# Patient Record
Sex: FEMALE | Race: BLACK OR AFRICAN AMERICAN | Employment: UNEMPLOYED | URBAN - METROPOLITAN AREA
[De-identification: names, ages, dates, MRNs, and addresses within clinical notes are randomized per-mention and may not be internally consistent; named-entity substitution may affect disease eponyms.]

---

## 2017-01-10 ENCOUNTER — ANESTHESIA EVENT (OUTPATIENT)
Dept: ENDOSCOPY | Age: 52
End: 2017-01-10
Payer: MEDICAID

## 2017-01-11 ENCOUNTER — ANESTHESIA (OUTPATIENT)
Dept: ENDOSCOPY | Age: 52
End: 2017-01-11
Payer: MEDICAID

## 2017-01-11 ENCOUNTER — SURGERY (OUTPATIENT)
Age: 52
End: 2017-01-11

## 2017-01-11 PROCEDURE — 74011250636 HC RX REV CODE- 250/636

## 2017-01-11 PROCEDURE — 74011250636 HC RX REV CODE- 250/636: Performed by: NURSE ANESTHETIST, CERTIFIED REGISTERED

## 2017-01-11 PROCEDURE — 74011000250 HC RX REV CODE- 250

## 2017-01-11 RX ORDER — PROPOFOL 10 MG/ML
INJECTION, EMULSION INTRAVENOUS AS NEEDED
Status: DISCONTINUED | OUTPATIENT
Start: 2017-01-11 | End: 2017-01-11 | Stop reason: HOSPADM

## 2017-01-11 RX ORDER — PROPOFOL 10 MG/ML
INJECTION, EMULSION INTRAVENOUS
Status: DISCONTINUED | OUTPATIENT
Start: 2017-01-11 | End: 2017-01-11 | Stop reason: HOSPADM

## 2017-01-11 RX ORDER — LIDOCAINE HYDROCHLORIDE 20 MG/ML
INJECTION, SOLUTION EPIDURAL; INFILTRATION; INTRACAUDAL; PERINEURAL AS NEEDED
Status: DISCONTINUED | OUTPATIENT
Start: 2017-01-11 | End: 2017-01-11 | Stop reason: HOSPADM

## 2017-01-11 RX ADMIN — PROPOFOL 100 MCG/KG/MIN: 10 INJECTION, EMULSION INTRAVENOUS at 08:35

## 2017-01-11 RX ADMIN — PROPOFOL 100 MG: 10 INJECTION, EMULSION INTRAVENOUS at 08:35

## 2017-01-11 RX ADMIN — SODIUM CHLORIDE, SODIUM LACTATE, POTASSIUM CHLORIDE, AND CALCIUM CHLORIDE: 600; 310; 30; 20 INJECTION, SOLUTION INTRAVENOUS at 08:30

## 2017-01-11 RX ADMIN — LIDOCAINE HYDROCHLORIDE 40 MG: 20 INJECTION, SOLUTION EPIDURAL; INFILTRATION; INTRACAUDAL; PERINEURAL at 08:35

## 2017-01-11 NOTE — ANESTHESIA POSTPROCEDURE EVALUATION
Post-Anesthesia Evaluation and Assessment    Patient: Ariana Mendoza MRN: [id number]  SSN: [id number]    YOB: 1965  Age: 45 y.o. Sex: female      Data from PACU flowsheet    Cardiovascular Function/Vital Signs  Visit Vitals    /65    Pulse 78    Temp 36.5 °C (97.7 °F)    Resp 16    Ht 5' 5\" (1.651 m)    Wt 103.9 kg (229 lb)    SpO2 96%    Breastfeeding No    BMI 38.11 kg/m2       Patient is status post anesthesia    Nausea/Vomiting: controlled    Postoperative hydration reviewed and adequate. Pain:  Managed    Neurological Status: At baseline    Mental Status and Level of Consciousness: Alert and oriented     Pulmonary Status:   Adequate oxygenation and airway patent    Complications related to anesthesia: None    Post-anesthesia assessment completed.  No concerns    Signed By: Philomena Xie CRNA     January 11, 2017

## 2017-01-11 NOTE — ANESTHESIA PREPROCEDURE EVALUATION
Anesthetic History   No history of anesthetic complications            Review of Systems / Medical History  Patient summary reviewed and pertinent labs reviewed    Pulmonary        Sleep apnea: CPAP    Asthma   Pertinent negatives: No smoker     Neuro/Psych   Within defined limits           Cardiovascular    Hypertension              Exercise tolerance: >4 METS     GI/Hepatic/Renal  Within defined limits              Endo/Other    Diabetes: type 2    Morbid obesity     Other Findings   Comments: Current Smoker? NO       Elective Surgery? Yes       Abstained from smoking 24 hours prior to anesthesia? N/A    Risk Factors for Postoperative nausea/vomiting:       History of postoperative nausea/vomiting? NO       Female? YES       Motion sickness? NO       Intended opioid administration for postoperative analgesia?   YES           Physical Exam    Airway  Mallampati: I  TM Distance: 4 - 6 cm  Neck ROM: normal range of motion   Mouth opening: Normal     Cardiovascular  Regular rate and rhythm,  S1 and S2 normal,  no murmur, click, rub, or gallop  Rhythm: regular  Rate: normal         Dental  No notable dental hx       Pulmonary  Breath sounds clear to auscultation               Abdominal  GI exam deferred       Other Findings            Anesthetic Plan    ASA: 3  Anesthesia type: MAC          Induction: Intravenous  Anesthetic plan and risks discussed with: Patient

## 2017-02-08 ENCOUNTER — HOSPITAL ENCOUNTER (OUTPATIENT)
Dept: GENERAL RADIOLOGY | Age: 52
Discharge: HOME | End: 2017-02-08
Payer: MEDICAID

## 2017-02-08 DIAGNOSIS — R10.13 EPIGASTRIC PAIN: ICD-10-CM

## 2017-02-08 PROCEDURE — 74020 XR ABD FLAT/ ERECT: CPT

## 2017-05-31 ENCOUNTER — HOSPITAL ENCOUNTER (OUTPATIENT)
Dept: PHYSICAL THERAPY | Age: 52
Discharge: HOME | End: 2017-05-31
Payer: MEDICAID

## 2017-05-31 PROCEDURE — 97162 PT EVAL MOD COMPLEX 30 MIN: CPT

## 2017-05-31 PROCEDURE — 97014 ELECTRIC STIMULATION THERAPY: CPT

## 2017-05-31 PROCEDURE — 97110 THERAPEUTIC EXERCISES: CPT
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
## 2017-06-01 ENCOUNTER — HOSPITAL ENCOUNTER (OUTPATIENT)
Dept: PHYSICAL THERAPY | Age: 52
Discharge: HOME | End: 2017-06-01
Payer: MEDICAID

## 2017-06-01 PROCEDURE — 97014 ELECTRIC STIMULATION THERAPY: CPT

## 2017-06-01 PROCEDURE — 97140 MANUAL THERAPY 1/> REGIONS: CPT

## 2017-06-01 PROCEDURE — 97110 THERAPEUTIC EXERCISES: CPT

## 2017-06-01 NOTE — PROGRESS NOTES
PT DAILY TREATMENT NOTE     Patient Name: Genet Press  Date:2017  : 1965  [x]  Patient  Verified  Payor: [employer] / Plan: Eriberto Slulivan / Product Type: Managed Care Medicaid /    In time:434  Out time:546  Total Treatment Time (min): 72  Visit #: 2 of     Treatment Area: Back pain, chronic [M54.9, G89.29]    SUBJECTIVE  Pain Level (0-10 scale): 7  Any medication changes, allergies to medications, adverse drug reactions, diagnosis change, or new procedure performed?: [x] No    [] Yes (see summary sheet for update)  Subjective functional status/changes:   [] No changes reported  \"I am doing ok. \"    OBJECTIVE  Modality rationale: decrease pain to improve the patients ability to improve activity tolerance    Min Type Additional Details   10 [x] Estim: []Att   [x]Unatt        []TENS instruct                  [x]IFC  []Premod   []NMES                     []Other:  []w/US   [x]w/ice B knee   [x]w/heat  Position: semi reclined  Location: LS     []  Traction: [] Cervical       []Lumbar                       [] Prone          []Supine                       []Intermittent   []Continuous Lbs:  [] before manual  [] after manual    []  Ultrasound: []Continuous   [] Pulsed                           []1MHz   []3MHz Location:  W/cm2:    []  Iontophoresis with dexamethasone         Location: [] Take home patch   [] In clinic    []  Ice     []  heat  []  Ice massage Position:  Location:    []  Vasopneumatic Device Pressure:       [] lo [] med [] hi   Temperature: [] lo [] med [] hi   [x] Skin assessment post-treatment:  [x]intact []redness- no adverse reaction       []redness  adverse reaction:       52 min Therapeutic Exercise:  [x] See flow sheet :Initiated ther ex per flow sheet    Rationale: increase ROM, increase strength, improve coordination and improve balance to improve the patients ability to decrease pain with activity and improved functional endurance     10 min Manual Therapy:  L hip PROM flexion, IR and ER   Rationale: decrease pain, increase ROM and increase tissue extensibility to decrease compensatory gait patterns and improve mobility to decrease hip and LS pain     x min Patient Education: [x] Review HEP from IE        Other Objective/Functional Measures: Therex initiated today - first FU post eval   Poor TS posture in seated     Pain Level (0-10 scale) post treatment: 5    ASSESSMENT/Changes in Function: Patient tolerated initiation of therex well - patient deconditioned but well motivated. 100% VC for form and technique for all newly introduced therex. Compensatory valsalva with TA but improved with practice. L hip significantly weakened from [facility] noted with march and LAQ. Increased time to complete therex sec to conditioning. Patient will continue to benefit from skilled PT services to modify and progress therapeutic interventions, address functional mobility deficits, address ROM deficits, address strength deficits, analyze and address soft tissue restrictions, analyze and cue movement patterns, analyze and modify body mechanics/ergonomics and assess and modify postural abnormalities to attain remaining goals.      [x]  See Plan of Care  []  See progress note/recertification  []  See Discharge Summary         Progress towards goals / Updated goals:  FIRST FU TREATMENT - CONT PER POT TO EST GOALS 6/1/2017    PLAN  [x]  Upgrade activities as tolerated     []  Continue plan of care  [x]  Update interventions per flow sheet       []  Discharge due to:_  [x]  Other:_assess response to first FU treatment       Kal Grace, PT 6/1/2017  11:14 AM

## 2017-06-05 ENCOUNTER — HOSPITAL ENCOUNTER (OUTPATIENT)
Dept: PHYSICAL THERAPY | Age: 52
Discharge: HOME | End: 2017-06-05
Payer: MEDICAID

## 2017-06-05 PROCEDURE — 97110 THERAPEUTIC EXERCISES: CPT

## 2017-06-05 PROCEDURE — 97140 MANUAL THERAPY 1/> REGIONS: CPT

## 2017-06-05 PROCEDURE — 97014 ELECTRIC STIMULATION THERAPY: CPT

## 2017-06-05 NOTE — PROGRESS NOTES
PT DAILY TREATMENT NOTE     Patient Name: Ynes Perales  Date:2017  : 1965  [x]  Patient  Verified  Payor: Giovana Benoit / Plan: [id number] / Product Type: Managed Care Medicaid /    In time: 11:35am     Out time: 11:45am  Total Treatment Time (min): 70  Visit #: 3 of     Treatment Area: Back pain, chronic [M54.9, G89.29]    SUBJECTIVE  Pain Level (0-10 scale): 10 in (R) knee, (L) low back  Any medication changes, allergies to medications, adverse drug reactions, diagnosis change, or new procedure performed?: [x] No    [] Yes (see summary sheet for update)  Subjective functional status/changes:   [] No changes reported  \"I have been using the back brace because everything hurts too much today. I did the exercises over the weekend, but not last night. I'd rather do these exercises than use pain medications. \"    OBJECTIVE  Modality rationale: decrease pain to improve the patients ability to improve activity tolerance    Min Type Additional Details   10 [x] Estim: []Att   [x]Unatt        []TENS instruct                  [x]IFC  []Premod   []NMES                     []Other:  []w/US   [x]w/ice B knee   [x]w/heat  Position: semi reclined  Location: LS     []  Traction: [] Cervical       []Lumbar                       [] Prone          []Supine                       []Intermittent   []Continuous Lbs:  [] before manual  [] after manual    []  Ultrasound: []Continuous   [] Pulsed                           []1MHz   []3MHz Location:  W/cm2:    []  Iontophoresis with dexamethasone         Location: [] Take home patch   [] In clinic    []  Ice     []  heat  []  Ice massage Position:  Location:    []  Vasopneumatic Device Pressure:       [] lo [] med [] hi   Temperature: [] lo [] med [] hi   [x] Skin assessment post-treatment:  [x]intact []redness- no adverse reaction       []redness  adverse reaction:     50 min Therapeutic Exercise:  [x] See flow sheet: added SKTC stretch, LTR, and piriformis stretch in supine with pushdown   Rationale: increase ROM, increase strength, improve coordination and improve balance to improve the patients ability to decrease pain with activity and improved functional endurance     10 min Manual Therapy:  L hip PROM flexion, IR and ER   Rationale: decrease pain, increase ROM and increase tissue extensibility to decrease compensatory gait patterns and improve mobility to decrease hip and LS pain     X min Patient Education: [x] Review HEP - added LTR, SKTC stretch, supine piriformis stretch, and nonsegmental rolling for bed mobility; refaxed POC to referring MD     Other Objective/Functional Measures:      Pain Level (0-10 scale) post treatment: 7     ASSESSMENT/Changes in Function:   Notable inc in pain levels patient attributes to poor weather. Discussed and instructed patient in non-segmental rolling with TA draw for supine<>sit transfers with patient able to properly return-demo in clinic. Significant ROM deficits into flexion as indicated by difficulty with seated marches; provided cueing for maintenance of upright posture and limited hip flexion without lumbar flexion for inc control. (+) response to PROM hip stretching with emphasis on [place]@R2integrated versus IR to improve piriformis tightness. Patient will continue to benefit from skilled PT services to modify and progress therapeutic interventions, address functional mobility deficits, address ROM deficits, address strength deficits, analyze and address soft tissue restrictions, analyze and cue movement patterns, analyze and modify body mechanics/ergonomics and assess and modify postural abnormalities to attain remaining goals. [x]  See Plan of Care  []  See progress note/recertification  []  See Discharge Summary         Progress towards goals / Updated goals:  Short Term Goals: To be accomplished in 1 weeks:  1. Pt will be independent and compliant with HEP.  -Goal progressing; patient reporting intermittent compliance; encouraged inc adherence to improve length of post-chain musculature to aid in hip mobility and reduce low back strain (6/5/17)  Long Term Goals: To be accomplished in 8-12 treatments:  1. Patient will increase FOTO score to 51/100 for indications of increased functional mobility. 2. Patient will demo increased LS flexion fingertips to mid shin for ease with LE dressing   3. Patient will demo 50% bridge for improved core strength and standing tolerance with ADLs   4.  Patient will amb 500 ft in clinic with [facility] for progression of amb tolerance for community mobility     PLAN  [x]  Upgrade activities as tolerated     [x]  Continue plan of care  [x]  Update interventions per flow sheet       []  Discharge due to:_  [x]  Other: cont to encourage wean from L/S brace as tolerated; add sheron Sands Poag, PTA 6/5/2017

## 2017-06-07 ENCOUNTER — HOSPITAL ENCOUNTER (OUTPATIENT)
Dept: PHYSICAL THERAPY | Age: 52
Discharge: HOME | End: 2017-06-07
Payer: MEDICAID

## 2017-06-07 PROCEDURE — 97014 ELECTRIC STIMULATION THERAPY: CPT

## 2017-06-07 PROCEDURE — 97110 THERAPEUTIC EXERCISES: CPT

## 2017-06-07 PROCEDURE — 97140 MANUAL THERAPY 1/> REGIONS: CPT

## 2017-06-07 NOTE — PROGRESS NOTES
PT DAILY TREATMENT NOTE     Patient Name: Alan Spurling  Date:2017  : 1965  [x]  Patient  Verified  Payor: Ian Celeste / Plan: [employer] / Product Type: Managed Care Medicaid /    In time: 6487   Out time: 3764  Total Treatment Time (min): 73  Visit #: 4 of     Treatment Area: Back pain, chronic [M54.9, G89.29]    SUBJECTIVE  Pain Level (0-10 scale):R knees, 8 back   Any medication changes, allergies to medications, adverse drug reactions, diagnosis change, or new procedure performed?: [x] No    [] Yes (see summary sheet for update)  Subjective functional status/changes:   [] No changes reported  \"I got my xray results. \"    OBJECTIVE  Modality rationale: decrease pain to improve the patients ability to improve activity tolerance    Min Type Additional Details   15 [x] Estim: []Att   [x]Unatt        []TENS instruct                  [x]IFC  []Premod   []NMES                     []Other:  []w/US   [x]w/ice B knee   [x]w/heat  Position: semi reclined  Location: LS     []  Traction: [] Cervical       []Lumbar                       [] Prone          []Supine                       []Intermittent   []Continuous Lbs:  [] before manual  [] after manual    []  Ultrasound: []Continuous   [] Pulsed                           []1MHz   []3MHz Location:  W/cm2:    []  Iontophoresis with dexamethasone         Location: [] Take home patch   [] In clinic    []  Ice     []  heat  []  Ice massage Position:  Location:    []  Vasopneumatic Device Pressure:       [] lo [] med [] hi   Temperature: [] lo [] med [] hi   [x] Skin assessment post-treatment:  [x]intact []redness- no adverse reaction       []redness  adverse reaction:     45 min Therapeutic Exercise:  [x] See flow sheet:    Rationale: increase ROM, increase strength, improve coordination and improve balance to improve the patients ability to decrease pain with activity and improved functional endurance     13 min Manual Therapy:  Prone LS STM and light touch for desensitization    Rationale: decrease pain, increase ROM and increase tissue extensibility to decrease compensatory gait patterns and improve mobility to decrease hip and LS pain     X min Patient Education: [x] Review HEP -progressed last session      Other Objective/Functional Measures:     Unable to initiate bridges sec to pain in supine   Added sit to stand to functional LE strengthening      Pain Level (0-10 scale) post treatment: 7    ASSESSMENT/Changes in Function: Patient with increased L hip pain and inability to lay supine sec to pain. Patient encouraged to return to surgeon that performed THR sec to groin pain. Significant tenderness to light touch that improved to fair tolerance with STM with manual today. Patient brought copy of TS xray and patient educated on results including moderate degenerative changes in disc and scoliosis- patient educated PT is indicated for both these dx. Patient will continue to benefit from skilled PT services to modify and progress therapeutic interventions, address functional mobility deficits, address ROM deficits, address strength deficits, analyze and address soft tissue restrictions, analyze and cue movement patterns, analyze and modify body mechanics/ergonomics and assess and modify postural abnormalities to attain remaining goals. [x]  See Plan of Care  []  See progress note/recertification  []  See Discharge Summary         Progress towards goals / Updated goals: All goals reviewed and progressing appropriately as of 6/7/2017   Short Term Goals: To be accomplished in 1 weeks:  1. Pt will be independent and compliant with HEP. -Goal progressing; patient reporting intermittent compliance; encouraged inc adherence to improve length of post-chain musculature to aid in hip mobility and reduce low back strain (6/5/17)  Long Term Goals: To be accomplished in 8-12 treatments:  1.  Patient will increase FOTO score to 51/100 for indications of increased functional mobility. 2. Patient will demo increased LS flexion fingertips to mid shin for ease with LE dressing   3. Patient will demo 50% bridge for improved core strength and standing tolerance with ADLs   4.  Patient will amb 500 ft in clinic with [facility] for progression of amb tolerance for community mobility     PLAN  [x]  Upgrade activities as tolerated     [x]  Continue plan of care  [x]  Update interventions per flow sheet       []  Discharge due to:_  [x]  Other: POC has bee signed - can schedule past 6/15 for the rest of June     Dariela Bruce, PT 6/7/2017

## 2017-06-12 ENCOUNTER — HOSPITAL ENCOUNTER (OUTPATIENT)
Dept: PHYSICAL THERAPY | Age: 52
Discharge: HOME | End: 2017-06-12
Payer: MEDICAID

## 2017-06-12 PROCEDURE — 97140 MANUAL THERAPY 1/> REGIONS: CPT

## 2017-06-12 PROCEDURE — 97110 THERAPEUTIC EXERCISES: CPT

## 2017-06-12 NOTE — PROGRESS NOTES
PT DAILY TREATMENT NOTE     Patient Name: Dinesh Recinos  Date:2017  : 1965  [x]  Patient  Verified  Payor: Verónica Diaz / Plan: Pema Dominguez / Product Type: Managed Care Medicaid /    In time: 11:01am      Out time: 12:00pm  Total Treatment Time (min): 59  Visit #: 5 of     Treatment Area: Back pain, chronic [M54.9, G89.29]    SUBJECTIVE  Pain Level (0-10 scale): 8 in L/S; 6 in (B) knees  Any medication changes, allergies to medications, adverse drug reactions, diagnosis change, or new procedure performed?: [x] No    [] Yes (see summary sheet for update)  Subjective functional status/changes:   [] No changes reported  \"The knees feel better. I have an appointment with the doctor who fixed my hip. I felt like the work Romana Hernández did on me last time helped. \" Patient denies radicular symptoms.      OBJECTIVE  Modality rationale: decrease pain to improve the patients ability to improve activity tolerance    Min Type Additional Details   TC [x] Estim: []Att   [x]Unatt        []TENS instruct                  [x]IFC  []Premod   []NMES                     []Other:  []w/US   [x]w/ice B knee   [x]w/heat  Position: semi reclined  Location: LS     []  Traction: [] Cervical       []Lumbar                       [] Prone          []Supine                       []Intermittent   []Continuous Lbs:  [] before manual  [] after manual    []  Ultrasound: []Continuous   [] Pulsed                           []1MHz   []3MHz Location:  W/cm2:    []  Iontophoresis with dexamethasone         Location: [] Take home patch   [] In clinic    []  Ice     []  heat  []  Ice massage Position:  Location:    []  Vasopneumatic Device Pressure:       [] lo [] med [] hi   Temperature: [] lo [] med [] hi   [x] Skin assessment post-treatment:  [x]intact []redness- no adverse reaction       []redness  adverse reaction:     34 min Therapeutic Exercise:  [x] See flow sheet: added ELIZABETH   Rationale: increase ROM, increase strength, improve coordination and improve balance to improve the patients ability to decrease pain with activity and improved functional endurance     25 min Manual Therapy:  prone (L) QL/LPS and (R) TPS/latissimus dorsi STM and light touch for desensitization; supine (L) hip PROM for flexion, IR/ER   Rationale: decrease pain, increase ROM and increase tissue extensibility to decrease compensatory gait patterns and improve mobility to decrease hip and LS pain     X min Patient Education: [x] Review HEP; discussed positioning for bed mobility to aid in sleep quality     Other Objective/Functional Measures:     ELIZABETH: inc pain, no residual   (+) TTP/inc tissue density along (L)>(R) QL and LPS   (+) pain with supine lying > hook lying    Pain Level (0-10 scale) post treatment: 6-7    ASSESSMENT/Changes in Function:   Improved control and stability with sit<>stand transfers. Added ELIZABETH to assess spinal involvement versus mm with NE. (+) TrP at (L) QL addressed well with manual interventions. Mild improvements in standing posture with reduced hyperlordosis. Patient will continue to benefit from skilled PT services to modify and progress therapeutic interventions, address functional mobility deficits, address ROM deficits, address strength deficits, analyze and address soft tissue restrictions, analyze and cue movement patterns, analyze and modify body mechanics/ergonomics and assess and modify postural abnormalities to attain remaining goals. [x]  See Plan of Care  []  See progress note/recertification  []  See Discharge Summary         Progress towards goals / Updated goals: All goals reviewed and progressing appropriately as of 6/12/2017   Short Term Goals: To be accomplished in 1 weeks:  1. Pt will be independent and compliant with HEP.  -Goal progressing; patient reporting intermittent compliance; encouraged inc adherence to improve length of post-chain musculature to aid in hip mobility and reduce low back strain (6/5/17)  Long Term Goals: To be accomplished in 8-12 treatments:  1. Patient will increase FOTO score to 51/100 for indications of increased functional mobility. 2. Patient will demo increased LS flexion fingertips to mid shin for ease with LE dressing   3. Patient will demo 50% bridge for improved core strength and standing tolerance with ADLs   4.  Patient will amb 500 ft in clinic with [facility] for progression of amb tolerance for community mobility     PLAN  [x]  Upgrade activities as tolerated     [x]  Continue plan of care  [x]  Update interventions per flow sheet       []  Discharge due to:_  [x]  Other: assess response to inc time with manual; add standing QL stretch    Perales Speak, PTA 6/12/2017

## 2017-06-15 ENCOUNTER — APPOINTMENT (OUTPATIENT)
Dept: PHYSICAL THERAPY | Age: 52
End: 2017-06-15
Payer: MEDICAID

## 2017-06-15 ENCOUNTER — HOSPITAL ENCOUNTER (OUTPATIENT)
Dept: CT IMAGING | Age: 52
Discharge: HOME | End: 2017-06-15
Attending: INTERNAL MEDICINE
Payer: MEDICAID

## 2017-06-15 DIAGNOSIS — R10.33 PERIUMBILICAL PAIN: ICD-10-CM

## 2017-06-15 LAB — CREAT UR-MCNC: 0.8 MG/DL (ref 0.6–1.3)

## 2017-06-15 PROCEDURE — 74011000255 HC RX REV CODE- 255: Performed by: INTERNAL MEDICINE

## 2017-06-15 PROCEDURE — 74177 CT ABD & PELVIS W/CONTRAST: CPT

## 2017-06-15 PROCEDURE — 74011636320 HC RX REV CODE- 636/320: Performed by: INTERNAL MEDICINE

## 2017-06-15 PROCEDURE — 82565 ASSAY OF CREATININE: CPT

## 2017-06-15 RX ORDER — BARIUM SULFATE 20 MG/ML
900 SUSPENSION ORAL
Status: COMPLETED | OUTPATIENT
Start: 2017-06-15 | End: 2017-06-15

## 2017-06-15 RX ADMIN — BARIUM SULFATE 900 ML: 21 SUSPENSION ORAL at 08:27

## 2017-06-15 RX ADMIN — IOPAMIDOL 100 ML: 612 INJECTION, SOLUTION INTRAVENOUS at 08:27

## 2017-06-16 ENCOUNTER — HOSPITAL ENCOUNTER (OUTPATIENT)
Dept: PHYSICAL THERAPY | Age: 52
End: 2017-06-16
Payer: MEDICAID

## 2017-06-22 ENCOUNTER — HOSPITAL ENCOUNTER (OUTPATIENT)
Dept: PHYSICAL THERAPY | Age: 52
Discharge: HOME | End: 2017-06-22
Payer: MEDICAID

## 2017-06-22 PROCEDURE — 97140 MANUAL THERAPY 1/> REGIONS: CPT

## 2017-06-22 PROCEDURE — 97110 THERAPEUTIC EXERCISES: CPT

## 2017-06-22 PROCEDURE — 97014 ELECTRIC STIMULATION THERAPY: CPT

## 2017-06-22 NOTE — PROGRESS NOTES
PT DAILY TREATMENT NOTE     Patient Name: Lou Madison  Date:2017  : 1965  [x]  Patient  Verified  Payor: Tracy Velasco / Plan: [id number] / Product Type: Managed Care Medicaid /    In time: 1:35pm      Out time: 2:14pm  Total Treatment Time (min): 39  Visit #: 6 of     Treatment Area: Back pain, chronic [M54.9, G89.29]    SUBJECTIVE  Pain Level (0-10 scale): 8 in L/S, 5 in (B) knees  Any medication changes, allergies to medications, adverse drug reactions, diagnosis change, or new procedure performed?: [] No    [x] Yes (see summary sheet for update)  Subjective functional status/changes:   [] No changes reported  \"I see the doctor who fixed my hip on Tuesday. I slipped and fell in the bathtub. \"    OBJECTIVE  Modality rationale: decrease pain to improve the patients ability to improve activity tolerance    Min Type Additional Details   10 [x] Estim: []Att   [x]Unatt        []TENS instruct                  [x]IFC  []Premod   []NMES                     []Other:  []w/US   [x]w/ice B knee   [x]w/heat  Position: semi reclined  Location: LS     []  Traction: [] Cervical       []Lumbar                       [] Prone          []Supine                       []Intermittent   []Continuous Lbs:  [] before manual  [] after manual    []  Ultrasound: []Continuous   [] Pulsed                           []1MHz   []3MHz Location:  W/cm2:    []  Iontophoresis with dexamethasone         Location: [] Take home patch   [] In clinic    []  Ice     []  heat  []  Ice massage Position:  Location:    []  Vasopneumatic Device Pressure:       [] lo [] med [] hi   Temperature: [] lo [] med [] hi   [x] Skin assessment post-treatment:  [x]intact []redness- no adverse reaction       []redness  adverse reaction:     9 min Therapeutic Exercise:  [x] See flow sheet: attempted side glide to (R) - poor tolerance   Rationale: increase ROM, increase strength, improve coordination and improve balance to improve the patients ability to decrease pain with activity and improved functional endurance     20 min Manual Therapy:  prone (B) QL, TPS, and LPS in prone; attempted shift correction but not tolerated well   Rationale: decrease pain, increase ROM and increase tissue extensibility to decrease compensatory gait patterns and improve mobility to decrease hip and LS pain     X min Patient Education: [x] Review HEP     Other Objective/Functional Measures:      Pain Level (0-10 scale) post treatment: 0    ASSESSMENT/Changes in Function:   Significant posterior chain tightness improved with manual interventions. Reviewed proper postural awareness for bed mobility and sit<>stands with excellent carryover. Pt notes recent fall in bathtub causing inc (R) low back pain, went to [facility], and was given x-rays pt reports DDD. Able to abolish pain today with modalities and manual.    Patient will continue to benefit from skilled PT services to modify and progress therapeutic interventions, address functional mobility deficits, address ROM deficits, address strength deficits, analyze and address soft tissue restrictions, analyze and cue movement patterns, analyze and modify body mechanics/ergonomics and assess and modify postural abnormalities to attain remaining goals. [x]  See Plan of Care  []  See progress note/recertification  []  See Discharge Summary         Progress towards goals / Updated goals: All goals reviewed and progressing appropriately as of 6/22/2017   Short Term Goals: To be accomplished in 1 weeks:  1. Pt will be independent and compliant with HEP. -Goal progressing; patient reporting intermittent compliance; encouraged inc adherence to improve length of post-chain musculature to aid in hip mobility and reduce low back strain (6/5/17)  Long Term Goals: To be accomplished in 8-12 treatments:  1. Patient will increase FOTO score to 51/100 for indications of increased functional mobility.    2. Patient will demo increased LS flexion fingertips to mid shin for ease with LE dressing   3. Patient will demo 50% bridge for improved core strength and standing tolerance with ADLs   4.  Patient will amb 500 ft in clinic with [facility] for progression of amb tolerance for community mobility     PLAN  [x]  Upgrade activities as tolerated     [x]  Continue plan of care  [x]  Update interventions per flow sheet       []  Discharge due to:_  [x]  Other: assess carryover with treatment, pt demo significant improvement in posterior chain extensibility and has been educated on effect of posture on pain    Fabiola Mena, PTA 6/22/2017

## 2017-06-23 RX ORDER — OMEPRAZOLE 20 MG/1
20 CAPSULE, DELAYED RELEASE ORAL DAILY
COMMUNITY

## 2017-06-26 ENCOUNTER — HOSPITAL ENCOUNTER (OUTPATIENT)
Dept: PHYSICAL THERAPY | Age: 52
Discharge: HOME | End: 2017-06-26
Payer: MEDICAID

## 2017-06-26 PROCEDURE — 97110 THERAPEUTIC EXERCISES: CPT

## 2017-06-26 PROCEDURE — 97014 ELECTRIC STIMULATION THERAPY: CPT

## 2017-06-26 NOTE — PROGRESS NOTES
PT DAILY TREATMENT NOTE     Patient Name: Da Christensen  Date:2017  : 1965  [x]  Patient  Verified  Payor: Gris Najera / Plan: [employer] / Product Type: Managed Care Medicaid /    In time:     Out time:   Total Treatment Time (min): 55  Visit #: 7 of     Treatment Area: Back pain, chronic [M54.9, G89.29]    SUBJECTIVE  Pain Level (0-10 scale): 7/10 LS   Any medication changes, allergies to medications, adverse drug reactions, diagnosis change, or new procedure performed?: [] No    [x] Yes (see summary sheet for update)  Subjective functional status/changes:   [] No changes reported  \"I go see the back doctor on . \"    OBJECTIVE  Modality rationale: decrease pain to improve the patients ability to improve activity tolerance    Min Type Additional Details   10 [x] Estim: []Att   [x]Unatt        []TENS instruct                  [x]IFC  []Premod   []NMES                     []Other:  []w/US   []w/ice B knee: PD    [x]w/heat  Position: prone  Location: LS     []  Traction: [] Cervical       []Lumbar                       [] Prone          []Supine                       []Intermittent   []Continuous Lbs:  [] before manual  [] after manual    []  Ultrasound: []Continuous   [] Pulsed                           []1MHz   []3MHz Location:  W/cm2:    []  Iontophoresis with dexamethasone         Location: [] Take home patch   [] In clinic    []  Ice     []  heat  []  Ice massage Position:  Location:    []  Vasopneumatic Device Pressure:       [] lo [] med [] hi   Temperature: [] lo [] med [] hi   [x] Skin assessment post-treatment:  [x]intact []redness- no adverse reaction       []redness  adverse reaction:     42 min Therapeutic Exercise:  [x] See flow sheet:    Rationale: increase ROM, increase strength, improve coordination and improve balance to improve the patients ability to decrease pain with activity and improved functional endurance     3 min Manual Therapy: Attempted STM and sacral float - co sharp pain   Rationale: decrease pain, increase ROM and increase tissue extensibility to decrease compensatory gait patterns and improve mobility to decrease hip and LS pain     X min Patient Education: [x] Review HEP     Other Objective/Functional Measures:     STG 1 assessed   LTG 2 - LS flexion : mid shin (after repeated flexion stretch)   Improvement: sitting tolerance on car ride 2.5 hours    Pain Level (0-10 scale) post treatment: 3    ASSESSMENT/Changes in Function: Patient educated on upcoming assessment on next visit. Patient to see Dr Antonio Barrientos tomorrow and [facility] on July 6. Will reassess NV for PN to hold chart open until seeing 2 other MDs. Held manual after attempted 3 min with poor tolerance. Added prone therex for posterior chain strengthening with fair tolerance and quick fatigue of gluteals. Patient will continue to benefit from skilled PT services to modify and progress therapeutic interventions, address functional mobility deficits, address ROM deficits, address strength deficits, analyze and address soft tissue restrictions, analyze and cue movement patterns, analyze and modify body mechanics/ergonomics and assess and modify postural abnormalities to attain remaining goals. [x]  See Plan of Care  []  See progress note/recertification  []  See Discharge Summary         Progress towards goals / Updated goals: All goals reviewed and progressing appropriately as of 6/26/2017   Short Term Goals: To be accomplished in 1 weeks:  1. Pt will be independent and compliant with HEP. -Goal progressing - patient reports every other day compliance and elliptical use for 3 min 6/26/17  Long Term Goals: To be accomplished in 8-12 treatments:  1. Patient will increase FOTO score to 51/100 for indications of increased functional mobility.    2. Patient will demo increased LS flexion fingertips to mid shin for ease with LE dressing - Goal met - patient demo mid shin AROM (fingertips to patella at IE) 6/26/17  3. Patient will demo 50% bridge for improved core strength and standing tolerance with ADLs   4. Patient will amb 500 ft in clinic with [facility] for progression of amb tolerance for community mobility     PLAN  [x]  Upgrade activities as tolerated     [x]  Continue plan of care  [x]  Update interventions per flow sheet       []  Discharge due to:_  [x]  Other: PN DUE NV  - will hold chart open for 30 days pending other MD apts.     Isaiah Rosario, PT 6/26/2017

## 2017-06-27 ENCOUNTER — ANESTHESIA EVENT (OUTPATIENT)
Dept: ENDOSCOPY | Age: 52
End: 2017-06-27
Payer: MEDICAID

## 2017-06-28 ENCOUNTER — ANESTHESIA (OUTPATIENT)
Dept: ENDOSCOPY | Age: 52
End: 2017-06-28
Payer: MEDICAID

## 2017-06-28 ENCOUNTER — HOSPITAL ENCOUNTER (OUTPATIENT)
Age: 52
Setting detail: OUTPATIENT SURGERY
Discharge: HOME | End: 2017-06-28
Attending: INTERNAL MEDICINE | Admitting: INTERNAL MEDICINE
Payer: MEDICAID

## 2017-06-28 VITALS
DIASTOLIC BLOOD PRESSURE: 91 MMHG | HEIGHT: 65 IN | OXYGEN SATURATION: 100 % | RESPIRATION RATE: 16 BRPM | BODY MASS INDEX: 39.15 KG/M2 | SYSTOLIC BLOOD PRESSURE: 136 MMHG | WEIGHT: 235 LBS | TEMPERATURE: 98.8 F | HEART RATE: 77 BPM

## 2017-06-28 LAB
BUN BLD-MCNC: 13 MG/DL (ref 7–18)
CHLORIDE BLD-SCNC: 105 MMOL/L (ref 100–108)
GLUCOSE BLD STRIP.AUTO-MCNC: 99 MG/DL (ref 74–106)
HCT VFR BLD CALC: 38 % (ref 36–49)
HGB BLD-MCNC: 12.9 G/DL (ref 12–16)
POTASSIUM BLD-SCNC: 3.8 MMOL/L (ref 3.5–5.5)
SODIUM BLD-SCNC: 143 MMOL/L (ref 136–145)

## 2017-06-28 PROCEDURE — 82947 ASSAY GLUCOSE BLOOD QUANT: CPT

## 2017-06-28 PROCEDURE — 76060000031 HC ANESTHESIA FIRST 0.5 HR: Performed by: INTERNAL MEDICINE

## 2017-06-28 PROCEDURE — 88305 TISSUE EXAM BY PATHOLOGIST: CPT | Performed by: INTERNAL MEDICINE

## 2017-06-28 PROCEDURE — 77030031670 HC DEV INFL ENDOTEK BIG60 MRTM -B: Performed by: INTERNAL MEDICINE

## 2017-06-28 PROCEDURE — 74011250636 HC RX REV CODE- 250/636

## 2017-06-28 PROCEDURE — 74011250636 HC RX REV CODE- 250/636: Performed by: NURSE ANESTHETIST, CERTIFIED REGISTERED

## 2017-06-28 PROCEDURE — 76040000019: Performed by: INTERNAL MEDICINE

## 2017-06-28 PROCEDURE — 74011000250 HC RX REV CODE- 250

## 2017-06-28 PROCEDURE — 77030009426 HC FCPS BIOP ENDOSC BSC -B: Performed by: INTERNAL MEDICINE

## 2017-06-28 RX ORDER — SODIUM CHLORIDE 0.9 % (FLUSH) 0.9 %
5-10 SYRINGE (ML) INJECTION AS NEEDED
Status: DISCONTINUED | OUTPATIENT
Start: 2017-06-28 | End: 2017-06-28 | Stop reason: HOSPADM

## 2017-06-28 RX ORDER — LIDOCAINE HYDROCHLORIDE 10 MG/ML
0.1 INJECTION, SOLUTION EPIDURAL; INFILTRATION; INTRACAUDAL; PERINEURAL AS NEEDED
Status: DISCONTINUED | OUTPATIENT
Start: 2017-06-28 | End: 2017-06-28 | Stop reason: HOSPADM

## 2017-06-28 RX ORDER — LIDOCAINE HYDROCHLORIDE 20 MG/ML
INJECTION, SOLUTION EPIDURAL; INFILTRATION; INTRACAUDAL; PERINEURAL AS NEEDED
Status: DISCONTINUED | OUTPATIENT
Start: 2017-06-28 | End: 2017-06-28 | Stop reason: HOSPADM

## 2017-06-28 RX ORDER — PROPOFOL 10 MG/ML
INJECTION, EMULSION INTRAVENOUS AS NEEDED
Status: DISCONTINUED | OUTPATIENT
Start: 2017-06-28 | End: 2017-06-28 | Stop reason: HOSPADM

## 2017-06-28 RX ORDER — FAMOTIDINE 20 MG/1
20 TABLET, FILM COATED ORAL ONCE
Status: DISCONTINUED | OUTPATIENT
Start: 2017-06-28 | End: 2017-06-28 | Stop reason: HOSPADM

## 2017-06-28 RX ORDER — SODIUM CHLORIDE 0.9 % (FLUSH) 0.9 %
5-10 SYRINGE (ML) INJECTION EVERY 8 HOURS
Status: DISCONTINUED | OUTPATIENT
Start: 2017-06-28 | End: 2017-06-28 | Stop reason: HOSPADM

## 2017-06-28 RX ORDER — DEXTROMETHORPHAN/PSEUDOEPHED 2.5-7.5/.8
1.2 DROPS ORAL
Status: DISCONTINUED | OUTPATIENT
Start: 2017-06-28 | End: 2017-06-28 | Stop reason: HOSPADM

## 2017-06-28 RX ORDER — SODIUM CHLORIDE, SODIUM LACTATE, POTASSIUM CHLORIDE, CALCIUM CHLORIDE 600; 310; 30; 20 MG/100ML; MG/100ML; MG/100ML; MG/100ML
25 INJECTION, SOLUTION INTRAVENOUS CONTINUOUS
Status: DISCONTINUED | OUTPATIENT
Start: 2017-06-28 | End: 2017-06-28 | Stop reason: HOSPADM

## 2017-06-28 RX ORDER — DEXTROSE MONOHYDRATE 25 G/50ML
25-50 INJECTION, SOLUTION INTRAVENOUS AS NEEDED
Status: DISCONTINUED | OUTPATIENT
Start: 2017-06-28 | End: 2017-06-28 | Stop reason: HOSPADM

## 2017-06-28 RX ORDER — MAGNESIUM SULFATE 100 %
4 CRYSTALS MISCELLANEOUS AS NEEDED
Status: DISCONTINUED | OUTPATIENT
Start: 2017-06-28 | End: 2017-06-28 | Stop reason: HOSPADM

## 2017-06-28 RX ORDER — INSULIN LISPRO 100 [IU]/ML
INJECTION, SOLUTION INTRAVENOUS; SUBCUTANEOUS ONCE
Status: DISCONTINUED | OUTPATIENT
Start: 2017-06-28 | End: 2017-06-28 | Stop reason: HOSPADM

## 2017-06-28 RX ADMIN — PROPOFOL 50 MG: 10 INJECTION, EMULSION INTRAVENOUS at 10:42

## 2017-06-28 RX ADMIN — PROPOFOL 50 MG: 10 INJECTION, EMULSION INTRAVENOUS at 10:46

## 2017-06-28 RX ADMIN — LIDOCAINE HYDROCHLORIDE 100 MG: 20 INJECTION, SOLUTION EPIDURAL; INFILTRATION; INTRACAUDAL; PERINEURAL at 10:42

## 2017-06-28 RX ADMIN — PROPOFOL 50 MG: 10 INJECTION, EMULSION INTRAVENOUS at 10:43

## 2017-06-28 RX ADMIN — PROPOFOL 20 MG: 10 INJECTION, EMULSION INTRAVENOUS at 10:54

## 2017-06-28 RX ADMIN — SODIUM CHLORIDE, SODIUM LACTATE, POTASSIUM CHLORIDE, AND CALCIUM CHLORIDE 25 ML/HR: 600; 310; 30; 20 INJECTION, SOLUTION INTRAVENOUS at 10:10

## 2017-06-28 RX ADMIN — PROPOFOL 50 MG: 10 INJECTION, EMULSION INTRAVENOUS at 10:50

## 2017-06-28 NOTE — ANESTHESIA PREPROCEDURE EVALUATION
Anesthetic History   No history of anesthetic complications            Review of Systems / Medical History  Patient summary reviewed and pertinent labs reviewed    Pulmonary        Sleep apnea: CPAP    Asthma : well controlled       Neuro/Psych         Psychiatric history     Cardiovascular    Hypertension                   GI/Hepatic/Renal     GERD: well controlled      PUD     Endo/Other    Diabetes: well controlled, type 2    Arthritis     Other Findings   Comments:   Risk Factors for Postoperative nausea/vomiting:       History of postoperative nausea/vomiting? NO       Female? YES       Motion sickness? NO       Intended opioid administration for postoperative analgesia? NO      Smoking Abstinence  Current Smoker? NO  Elective Surgery? YES  Seen preoperatively by anesthesiologist or proxy prior to day of surgery? YES  Pt abstained from smoking 24 hours prior to anesthesia?  N/A           Physical Exam    Airway  Mallampati: II  TM Distance: 4 - 6 cm  Neck ROM: normal range of motion   Mouth opening: Normal     Cardiovascular               Dental         Pulmonary                 Abdominal  GI exam deferred       Other Findings            Anesthetic Plan    ASA: 3  Anesthesia type: MAC            Anesthetic plan and risks discussed with: Patient

## 2017-06-28 NOTE — DISCHARGE INSTRUCTIONS
Patient Discharge Instructions    Jeffry Flores / [id number] : 1965    Admitted 2017 Discharged: 2017         Procedure Impression:  1. Single 3mm rectum polyp removed with biopsy forceps. 2. Diverticulosis with no diverticulitis. 3. Otherwise normal colonoscopy including terminal ileum evaluation. Recommendation:  1. Resume regular diet, recommend high fiber  2. Will contact with polyp results in 2 weeks. These results will determine timing to next colon cancer screening. 3. Please contact our office if you have not received the results by three weeks. Recommended Diet: Regular Diet    Recommended Activity:    1. Do not drink alcohol, drive or operate machinery for 12 hours   2. Call if any fever, abdominal pain or bleeding noted. Signed By: Amy Hauser MD     2017         DISCHARGE SUMMARY from Nurse    The following personal items are in your possession at time of discharge:    Dental Appliances: None  Visual Aid: Glasses                            PATIENT INSTRUCTIONS:    After general anesthesia or intravenous sedation, for 24 hours or while taking prescription Narcotics:  · Limit your activities  · Do not drive and operate hazardous machinery  · Do not make important personal or business decisions  · Do  not drink alcoholic beverages  · If you have not urinated within 8 hours after discharge, please contact your surgeon on call.     Report the following to your surgeon:  · Excessive pain, swelling, redness or odor of or around the surgical area  · Temperature over 100.5  · Nausea and vomiting lasting longer than 4 hours or if unable to take medications  · Any signs of decreased circulation or nerve impairment to extremity: change in color, persistent  numbness, tingling, coldness or increase pain  · Any questions        What to do at Home:        These are general instructions for a healthy lifestyle:    No smoking/ No tobacco products/ Avoid exposure to second hand smoke    Surgeon General's Warning:  Quitting smoking now greatly reduces serious risk to your health. Obesity, smoking, and sedentary lifestyle greatly increases your risk for illness    A healthy diet, regular physical exercise & weight monitoring are important for maintaining a healthy lifestyle    You may be retaining fluid if you have a history of heart failure or if you experience any of the following symptoms:  Weight gain of 3 pounds or more overnight or 5 pounds in a week, increased swelling in our hands or feet or shortness of breath while lying flat in bed. Please call your doctor as soon as you notice any of these symptoms; do not wait until your next office visit. Recognize signs and symptoms of STROKE:    F-face looks uneven    A-arms unable to move or move unevenly    S-speech slurred or non-existent    T-time-call 911 as soon as signs and symptoms begin-DO NOT go       Back to bed or wait to see if you get better-TIME IS BRAIN. Warning Signs of HEART ATTACK     Call 911 if you have these symptoms:   Chest discomfort. Most heart attacks involve discomfort in the center of the chest that lasts more than a few minutes, or that goes away and comes back. It can feel like uncomfortable pressure, squeezing, fullness, or pain.  Discomfort in other areas of the upper body. Symptoms can include pain or discomfort in one or both arms, the back, neck, jaw, or stomach.  Shortness of breath with or without chest discomfort.  Other signs may include breaking out in a cold sweat, nausea, or lightheadedness. Don't wait more than five minutes to call 911 - MINUTES MATTER! Fast action can save your life. Calling 911 is almost always the fastest way to get lifesaving treatment. Emergency Medical Services staff can begin treatment when they arrive -- up to an hour sooner than if someone gets to the hospital by car. The discharge information has been reviewed with the patient.   The patient verbalized understanding. Discharge medications reviewed with the patient and appropriate educational materials and side effects teaching were provided. Patient armband removed and given to patient to take home.   Patient was informed of the privacy risks if armband lost or stolen

## 2017-06-28 NOTE — H&P
History and Physical    Trever Flores        1965  [id number]        [id number]     Pre-Procedure Diagnosis:  constipation  k59.00    Chief Complaint:  No chief complaint on file. HPI: Patient with constipation with recent worsening. She has family history of colon cancer and overdue for screening. No blood with bowel movements. No nausea or vomiting. She has associated pain but no source on ct scan. No other complaints.      Past Medical History:   Diagnosis Date    Arthritis     bilaterial knees    Asthma     fair control, last attack October 2015    Balance problems     Bursitis of hip     Chondromalacia of patella, right     chronic    Chronic lung disease     Chronic pain     left hip knees    CTS (carpal tunnel syndrome)     right wrist    Degenerative arthritis of right knee     mild    Depression     Diabetes ([address] 4-2014    Epicondylitis     right elbow    Genu valgum     right knee    GERD (gastroesophageal reflux disease)     fair control, food specific. no current symptoms    Hyperlipemia     Hypertension 2006    Impingement syndrome of shoulder     right    Necrosis (HCC)     Osteoarthritis of left hip 5/3/2016    PUD (peptic ulcer disease)     Shortness of breath     Sleep apnea 3/19/2015    Mild AHI 11 But severe sleepiness ESS 17    Tendonitis of shoulder, right     Unspecified sleep apnea      uses CPAP     Past Surgical History:   Procedure Laterality Date    HX HIP REPLACEMENT Left 05/2016    HX HYSTERECTOMY      HX TUBAL LIGATION       Family History   Problem Relation Age of Onset    Diabetes Mother     Cancer Mother     Diabetes Father     Cancer Father     Diabetes Other      Grandfather    Hypertension Other     Arthritis-osteo Other      Social History     Social History    Marital status:      Spouse name: N/A    Number of children: N/A    Years of education: N/A     Social History Main Topics    Smoking status: Never Smoker    Smokeless tobacco: Never Used    Alcohol use No    Drug use: No    Sexual activity: No     Other Topics Concern    None     Social History Narrative       Allergies:  No Known Allergies  Medications:   No current facility-administered medications for this encounter. Current Outpatient Prescriptions   Medication Sig    TIOTROPIUM BROMIDE (SPIRIVA RESPIMAT IN) Take  by inhalation daily.  omeprazole (PRILOSEC) 20 mg capsule Take 20 mg by mouth daily.  OMALIZUMAB (XOLAIR SC) by SubCUTAneous route Once every 2 weeks. Indications: ASTHMA PREVENTION    amLODIPine (NORVASC) 2.5 mg tablet Take 2.5 mg by mouth daily. Indications: HYPERTENSION    hydrochlorothiazide (HYDRODIURIL) 25 mg tablet Take 25 mg by mouth daily. Indications: HYPERTENSION    FOLIC ACID/MV,FE,MIN (CENTRUM PO) Take 1 Tab by mouth daily. gummy    cholecalciferol, VITAMIN D3, (VITAMIN D3) 5,000 unit tab tablet Take 5,000 Units by mouth daily.  beclomethasone (QVAR) 80 mcg/actuation inhaler Take 1 Puff by inhalation two (2) times a day.  albuterol (ACCUNEB) 1.25 mg/3 mL nebulizer solution 1.25 mg by Nebulization route every four (4) hours as needed.  losartan (COZAAR) 100 mg tablet Take 100 mg by mouth daily.  cpap machine kit by Does Not Apply route. Start patient on CPAP 6 with ramp and humidification. Medium Quattro or better fitting mask. Supplies 99 month. Please send compliance data [id number]. Diagnosis KONSTANTIN. AHI 11 RDI 11    azelastine (ASTELIN) 137 mcg nasal spray 2 Sprays by Both Nostrils route as needed.  ondansetron (ZOFRAN ODT) 4 mg disintegrating tablet 4 mg every eight (8) hours as needed.  metformin (GLUCOPHAGE) 500 mg tablet Take 500 mg by mouth daily (with breakfast).  budesonide-formoterol (SYMBICORT) 160-4.5 mcg/actuation HFA inhaler Take 2 puffs by inhalation two (2) times a day.  montelukast (SINGULAIR) 5 mg chewable tablet Take 1 tablet by mouth nightly.  CALCIUM PO Take 600 mg by mouth daily.  ATORVASTATIN CALCIUM (LIPITOR PO) Take 40 mg by mouth nightly.  ALBUTEROL SULFATE (PROVENTIL PO) Take  by mouth as needed.  oxyCODONE-acetaminophen (PERCOCET) 5-325 mg per tablet Take 1-2 tablets by mouth every 4-6 hours as needed for pain.  levalbuterol tartrate (XOPENEX HFA) 45 mcg/actuation inhaler Take 2 puffs by inhalation every four (4) hours as needed for Wheezing.  GLIPIZIDE PO Take 2.5 mg by mouth daily. Vital Signs   Visit Vitals    Ht 5' 5\" (1.651 m)    Wt 106.6 kg (235 lb)    BMI 39.11 kg/m2       Review of Systems  A comprehensive review of systems was negative except for that written in the History of Present Illness. Physical Exam:  General:  Alert, cooperative, no distress, appears stated age. Eyes:  Conjunctivae/corneas clear. PERRL, EOMs intact. Fundi benign           Mouth/Throat: Lips, mucosa, and tongue normal. Teeth and gums normal.   Neck: Supple, symmetrical, trachea midline, no adenopathy, thyroid: no enlargement/tenderness/nodules, no carotid bruit and no JVD. Lungs:   Clear to auscultation bilaterally. Heart:  Regular rate and rhythm, S1, S2 normal, no murmur, click, rub or gallop. Abdomen:   Soft, non-tender. Bowel sounds normal. No masses,  No organomegaly. Extremities: Extremities normal, atraumatic, no cyanosis or edema. Skin: Skin color, texture, turgor normal. No rashes or lesions             Laboratory Data:  No results found for this or any previous visit (from the past 24 hour(s)). Hospital Problems  Date Reviewed: 1/11/2017    None          Impression and Plan:  Constipation and family history of colon cancer. Recommend colonoscopy.   See prior H&P      Michael Yeboah MD  6/28/2017  8:57 AM

## 2017-06-28 NOTE — IP AVS SNAPSHOT
[address] [place] [address] [address] 
[phone] Patient: Alan Spurling MRN: [id number] :1965 You are allergic to the following No active allergies Recent Documentation Height Weight Breastfeeding? BMI OB Status Smoking Status 1.651 m 106.6 kg No 39.11 kg/m2 Hysterectomy Never Smoker Emergency Contacts Name Discharge Info Relation Home Work Mobile [facility] CAREGIVER [3] Boyfriend [17] [phone] [address]  Daughter [21] [phone] About your hospitalization You were admitted on:  2017 [facility] You were discharged on:  2017 Unit phone number:  [phone] Why you were hospitalized Your primary diagnosis was:  Not on File Providers Seen During Your Hospitalizations Provider Role Specialty Primary office phone Bladimir Wheatley MD Attending Provider Gastroenterology [phone] Your Primary Care Physician (PCP) Primary Care Physician Office Phone Office Fax [facility] [phone] Follow-up Information Follow up With Details Comments Contact Info Amena Jackson MD   [address] [facility] [address] [address] [phone] Your Appointments   9:30 AM EDT  
PT GENERAL F/U with Lara Galicia PTA  
[facility] PT ZENOBIA CONNOLLY [facility]) [address] [id number] [phone]  12:50 PM EDT Follow Up with Angie Verdugo MD  
[facility]) [address] [facility] [address]  
[phone] Current Discharge Medication List  
  
CONTINUE these medications which have NOT CHANGED Dose & Instructions Dispensing Information Comments Morning Noon Evening Bedtime  
 amLODIPine 2.5 mg tablet Commonly known as:  Crissy Galvan Your last dose was: Your next dose is:    
   
   
 Dose:  2.5 mg Take 2.5 mg by mouth daily. Indications: HYPERTENSION Refills:  0  
     
   
   
   
  
 azelastine 137 mcg (0.1 %) nasal spray Commonly known as:  ASTELIN Your last dose was: Your next dose is:    
   
   
 Dose:  2 Spray 2 Sprays by Both Nostrils route as needed. Refills:  0  
     
   
   
   
  
 beclomethasone 80 mcg/actuation Aero Commonly known as:  QVAR Your last dose was: Your next dose is:    
   
   
 Dose:  1 Puff Take 1 Puff by inhalation two (2) times a day. Refills:  0  
     
   
   
   
  
 CALCIUM PO Your last dose was: Your next dose is:    
   
   
 Dose:  600 mg Take 600 mg by mouth daily. Refills:  0 CENTRUM PO Your last dose was: Your next dose is:    
   
   
 Dose:  1 Tab Take 1 Tab by mouth daily. gummy Refills:  0  
     
   
   
   
  
 cholecalciferol (VITAMIN D3) 5,000 unit Tab tablet Commonly known as:  VITAMIN D3 Your last dose was: Your next dose is:    
   
   
 Dose:  5000 Units Take 5,000 Units by mouth daily. Refills:  0  
     
   
   
   
  
 cpap machine kit Your last dose was: Your next dose is:    
   
   
 by Does Not Apply route. Start patient on CPAP 6 with ramp and humidification. Medium Quattro or better fitting mask. Supplies 99 month. Please send compliance data [id number]. Diagnosis KONSTANTIN. AHI 11 RDI 11 Quantity:  1 Kit Refills:  0 GLIPIZIDE PO Your last dose was: Your next dose is:    
   
   
 Dose:  2.5 mg Take 2.5 mg by mouth daily. Refills:  0  
     
   
   
   
  
 hydroCHLOROthiazide 25 mg tablet Commonly known as:  HYDRODIURIL  
   
 Your last dose was: Your next dose is:    
   
   
 Dose:  25 mg Take 25 mg by mouth daily. Indications: HYPERTENSION Refills:  0 LIPITOR PO Your last dose was: Your next dose is:    
   
   
 Dose:  40 mg Take 40 mg by mouth nightly. Refills:  0  
     
   
   
   
  
 losartan 100 mg tablet Commonly known as:  COZAAR Your last dose was: Your next dose is:    
   
   
 Dose:  100 mg Take 100 mg by mouth daily. Refills:  0  
     
   
   
   
  
 metFORMIN 500 mg tablet Commonly known as:  GLUCOPHAGE Your last dose was: Your next dose is:    
   
   
 Dose:  500 mg Take 500 mg by mouth daily (with breakfast). Refills:  0  
     
   
   
   
  
 montelukast 5 mg chewable tablet Commonly known as:  SINGULAIR Your last dose was: Your next dose is:    
   
   
 Dose:  5 mg Take 1 tablet by mouth nightly. Quantity:  30 tablet Refills:  2  
     
   
   
   
  
 ondansetron 4 mg disintegrating tablet Commonly known as:  ZOFRAN ODT Your last dose was: Your next dose is:    
   
   
 Dose:  4 mg 4 mg every eight (8) hours as needed. Refills:  0  
     
   
   
   
  
 oxyCODONE-acetaminophen 5-325 mg per tablet Commonly known as:  PERCOCET Your last dose was: Your next dose is: Take 1-2 tablets by mouth every 4-6 hours as needed for pain. Quantity:  60 Tab Refills:  0 PriLOSEC 20 mg capsule Generic drug:  omeprazole Your last dose was: Your next dose is:    
   
   
 Dose:  20 mg Take 20 mg by mouth daily. Refills:  0  
     
   
   
   
  
 * albuterol 1.25 mg/3 mL Nebu Commonly known as:  Tato Demark Your last dose was: Your next dose is:    
   
   
 Dose:  1.25 mg  
1.25 mg by Nebulization route every four (4) hours as needed.   
 Refills:  0  
     
   
   
   
  
 * PROVENTIL PO  
   
 Your last dose was: Your next dose is: Take  by mouth as needed. Refills:  0 [address] Your last dose was: Your next dose is: Take  by inhalation daily. Refills:  0  
     
   
   
   
  
 SYMBICORT 160-4.5 mcg/actuation HFA inhaler Generic drug:  budesonide-formoterol Your last dose was: Your next dose is:    
   
   
 Dose:  2 Puff Take 2 puffs by inhalation two (2) times a day. Refills:  0  
     
   
   
   
  
 XOLAIR SC Your last dose was: Your next dose is:    
   
   
 by SubCUTAneous route Once every 2 weeks. Indications: ASTHMA PREVENTION Refills:  0  
     
   
   
   
  
 XOPENEX HFA 45 mcg/actuation inhaler Generic drug:  levalbuterol tartrate Your last dose was: Your next dose is:    
   
   
 Dose:  2 Puff Take 2 puffs by inhalation every four (4) hours as needed for Wheezing. Refills:  0  
     
   
   
   
  
 * Notice: This list has 2 medication(s) that are the same as other medications prescribed for you. Read the directions carefully, and ask your doctor or other care provider to review them with you. Discharge Instructions Patient Discharge Instructions Maite Campos / [id number] : 1965 Admitted 2017 Discharged: 2017 Procedure Impression: 1. Single 3mm rectum polyp removed with biopsy forceps. 2. Diverticulosis with no diverticulitis. 3. Otherwise normal colonoscopy including terminal ileum evaluation. Recommendation: 1. Resume regular diet, recommend high fiber 2. Will contact with polyp results in 2 weeks. These results will determine timing to next colon cancer screening. 3. Please contact our office if you have not received the results by three weeks. Recommended Diet: Regular Diet Recommended Activity: 1. Do not drink alcohol, drive or operate machinery for 12 hours 2. Call if any fever, abdominal pain or bleeding noted. Signed By: Joana Calderon MD   
 June 28, 2017 DISCHARGE SUMMARY from Nurse The following personal items are in your possession at time of discharge: 
 
Dental Appliances: None Visual Aid: Glasses PATIENT INSTRUCTIONS: 
 
After general anesthesia or intravenous sedation, for 24 hours or while taking prescription Narcotics: · Limit your activities · Do not drive and operate hazardous machinery · Do not make important personal or business decisions · Do  not drink alcoholic beverages · If you have not urinated within 8 hours after discharge, please contact your surgeon on call. Report the following to your surgeon: 
· Excessive pain, swelling, redness or odor of or around the surgical area · Temperature over 100.5 · Nausea and vomiting lasting longer than 4 hours or if unable to take medications · Any signs of decreased circulation or nerve impairment to extremity: change in color, persistent  numbness, tingling, coldness or increase pain · Any questions What to do at Home: These are general instructions for a healthy lifestyle: No smoking/ No tobacco products/ Avoid exposure to second hand smoke Surgeon General's Warning:  Quitting smoking now greatly reduces serious risk to your health. Obesity, smoking, and sedentary lifestyle greatly increases your risk for illness A healthy diet, regular physical exercise & weight monitoring are important for maintaining a healthy lifestyle You may be retaining fluid if you have a history of heart failure or if you experience any of the following symptoms:  Weight gain of 3 pounds or more overnight or 5 pounds in a week, increased swelling in our hands or feet or shortness of breath while lying flat in bed. Please call your doctor as soon as you notice any of these symptoms; do not wait until your next office visit. Recognize signs and symptoms of STROKE: 
 
F-face looks uneven A-arms unable to move or move unevenly S-speech slurred or non-existent T-time-call 911 as soon as signs and symptoms begin-DO NOT go Back to bed or wait to see if you get better-TIME IS BRAIN. Warning Signs of HEART ATTACK Call 911 if you have these symptoms: 
? Chest discomfort. Most heart attacks involve discomfort in the center of the chest that lasts more than a few minutes, or that goes away and comes back. It can feel like uncomfortable pressure, squeezing, fullness, or pain. ? Discomfort in other areas of the upper body. Symptoms can include pain or discomfort in one or both arms, the back, neck, jaw, or stomach. ? Shortness of breath with or without chest discomfort. ? Other signs may include breaking out in a cold sweat, nausea, or lightheadedness. Don't wait more than five minutes to call [address]! Fast action can save your life. Calling 911 is almost always the fastest way to get lifesaving treatment. Emergency Medical Services staff can begin treatment when they arrive  up to an hour sooner than if someone gets to the hospital by car. The discharge information has been reviewed with the patient. The patient verbalized understanding. Discharge medications reviewed with the patient and appropriate educational materials and side effects teaching were provided. Patient armband removed and given to patient to take home. Patient was informed of the privacy risks if armband lost or stolen Discharge Orders None Introducing [facility] SERVICES! Tanis Epley introduces [employer] patient portal. Now you can access parts of your medical record, email your doctor's office, and request medication refills online. 1. In your internet browser, go to [email or web address] 2. Click on the First Time User? Click Here link in the Sign In box.  You will see the New Member Sign Up page. 3. Enter your [facility] Access Code exactly as it appears below. You will not need to use this code after youve completed the sign-up process. If you do not sign up before the expiration date, you must request a new code. · [facility] Access Code: [email or web address] Expires: 8/23/2017 12:37 PM 
 
4. Enter the last four digits of your Social Security Number (xxxx) and Date of Birth (mm/dd/yyyy) as indicated and click Submit. You will be taken to the next sign-up page. 5. Create a [facility] ID. This will be your [facility] login ID and cannot be changed, so think of one that is secure and easy to remember. 6. Create a [facility] password. You can change your password at any time. 7. Enter your Password Reset Question and Answer. This can be used at a later time if you forget your password. 8. Enter your e-mail address. You will receive e-mail notification when new information is available in [address] 9. Click Sign Up. You can now view and download portions of your medical record. 10. Click the Download Summary menu link to download a portable copy of your medical information. If you have questions, please visit the Frequently Asked Questions section of the [facility] website. Remember, [facility] is NOT to be used for urgent needs. For medical emergencies, dial 911. Now available from your iPhone and Android! General Information Please provide this summary of care documentation to your next provider. Patient Signature:  ____________________________________________________________ Date:  ____________________________________________________________  
  
Lois Ashly Provider Signature:  ____________________________________________________________ Date:  ____________________________________________________________

## 2017-06-28 NOTE — PROCEDURES
Colonoscopy Report    Patient: Kathy Fernandez MRN: [id number]  SSN: [id number]    YOB: 1965  Age: 47 y.o. Sex: female      Date of Procedure: 6/28/2017    IMPRESSION:  1. Single 3mm rectum polyp removed with biopsy forceps. 2. Diverticulosis with no diverticulitis. 3. Otherwise normal colonoscopy including terminal ileum evaluation. RECOMMENDATIONS:  1. Resume regular diet, Recommend high fiber. 2. Will contact with polyp results in 2 weeks. These results will determine timing to next screening. Patient will be instructed to contact our office if they have not received the results by three weeks. Indication:  Constipation, Family history of coloretal cancer (screening only)  Procedure Performed: Colonoscopy polypectomy (cold biopsy)  Endoscopist: Cecilio Giles MD  Assistant: Endoscopy Technician-1: Annie Palmer  Endoscopy RN-1: Edie Gee RN  Endoscopy RN-2: Ryan Arvizu RN  ASA: ASA 3 - Patient with moderate systemic disease with functional limitations  Mallampati Score: II (soft palate, uvula, fauces visible)  Anesthesia: MAC anesthesia  Endoscope:     [x]  CF H 190AL   []  PCF H190AL   []  GIF H 190    Extent of Examination:terminal ileum  Quality of Preparation:     []  Excellent   [x]  Very Good   [] Fair but adequate   [] Fair, inadequate   []  Poor      Technique: The procedure was discussed with the patient including risks, benefits, alternatives including risks of IV sedation, bleeding, perforation and missed polyp. A safety timeout was performed. The patient was given incremental doses of intravenous sedation to achieve moderate sedation. The patients vital signs were monitored at all times including heart rate, rhythm, blood pressure and oxygen saturation. The patient was placed in left lateral position. When adequate sedation was achieved a perianal inspection and a digital rectal exam were performed.  Video colonoscope was introduced into the rectum and advanced under direct vision up to the terminal ileum. The cecum was identified by IC valve, appendiceal orifice and crows foot. With adequate insufflation and maneuvering of the withdrawing scope, the colonic mucosa was visualized carefully. Retroflexion was performed in the rectum and the distal rectum visualized. The patient tolerated the procedure very well and was transferred to recovery area. Findings:  1. Normal rectal exam.   2. Normal terminal ileum with no ulceration, mass, stricture. 3. There was a single 3mm sessile polyp in the rectum. The polyp was removed with biopsy forceps. 4. There were numerous diverticula in the descending and sigmoid colon. No associated inflammation or bleeding noted. 5. The colonic mucosa was otherwise normal with no other polyps and no ulceration, mass, stricture.        EBL:None  Specimen:   ID Type Source Tests Collected by Time Destination   1 : Bx polyp Preservative Rectum  Kelly Devries MD 6/28/2017 1053 Pathology       Kelly Devries MD  June 28, 2017  10:56 AM

## 2017-06-28 NOTE — ANESTHESIA POSTPROCEDURE EVALUATION
Post-Anesthesia Evaluation and Assessment    Patient: Kathy Fernandez MRN: [id number]  SSN: [id number]    YOB: 1965  Age: 47 y.o. Sex: female       Cardiovascular Function/Vital Signs  Visit Vitals    BP (!) 136/91 (BP 1 Location: Left arm, BP Patient Position: At rest)    Pulse 77    Temp 37.1 °C (98.8 °F)    Resp 16    Ht 5' 5\" (1.651 m)    Wt 106.6 kg (235 lb)    SpO2 100%    Breastfeeding No    BMI 39.11 kg/m2       Patient is status post MAC anesthesia for Procedure(s):  COLONOSCOPY. Nausea/Vomiting: None    Postoperative hydration reviewed and adequate. Pain:  Pain Scale 1: Numeric (0 - 10) (06/28/17 1102)  Pain Intensity 1: 0 (06/28/17 1102)   Managed    Neurological Status: At baseline    Mental Status and Level of Consciousness: Alert and oriented     Pulmonary Status:   O2 Device: Oxygen mask (06/28/17 1058)   Adequate oxygenation and airway patent    Complications related to anesthesia: None    Post-anesthesia assessment completed.  No concerns    Signed By: Nelida Tapia CRNA     June 28, 2017

## 2017-06-29 ENCOUNTER — APPOINTMENT (OUTPATIENT)
Dept: PHYSICAL THERAPY | Age: 52
End: 2017-06-29
Payer: MEDICAID

## 2017-07-03 ENCOUNTER — HOSPITAL ENCOUNTER (OUTPATIENT)
Dept: PHYSICAL THERAPY | Age: 52
Discharge: HOME | End: 2017-07-03
Payer: MEDICAID

## 2017-07-03 PROCEDURE — 97110 THERAPEUTIC EXERCISES: CPT

## 2017-07-03 PROCEDURE — 97014 ELECTRIC STIMULATION THERAPY: CPT

## 2017-07-03 NOTE — PROGRESS NOTES
PT DAILY TREATMENT NOTE     Patient Name: Mumtaz Sanchez  Date:7/3/2017  : 1965  [x]  Patient  Verified  Payor: Rui Grossman / Plan: [place] [employer] MEDICAID / Product Type: Managed Care Medicaid /    In time: 5:16pm    Out time: 6:11pm  Total Treatment Time (min): 55  Visit #: 8 of     Treatment Area: Back pain, chronic [M54.9, G89.29]    SUBJECTIVE  Pain Level (0-10 scale): 7  Any medication changes, allergies to medications, adverse drug reactions, diagnosis change, or new procedure performed?: [] No    [x] Yes (see summary sheet for update)  Subjective functional status/changes:   [] No changes reported  \"I'm getting out of bed better. I have an MRI, x-ray, and nerve thing on top of doctor's appoints for the next few weeks. \"    OBJECTIVE  Modality rationale: decrease pain to improve the patients ability to improve activity tolerance    Min Type Additional Details   10 [x] Estim: []Att   [x]Unatt        []TENS instruct                  [x]IFC  []Premod   []NMES                     []Other:  []w/US   []w/ice B knee: PD    [x]w/heat  Position: prone  Location: LS     []  Traction: [] Cervical       []Lumbar                       [] Prone          []Supine                       []Intermittent   []Continuous Lbs:  [] before manual  [] after manual    []  Ultrasound: []Continuous   [] Pulsed                           []1MHz   []3MHz Location:  W/cm2:    []  Iontophoresis with dexamethasone         Location: [] Take home patch   [] In clinic    []  Ice     []  heat  []  Ice massage Position:  Location:    []  Vasopneumatic Device Pressure:       [] lo [] med [] hi   Temperature: [] lo [] med [] hi   [x] Skin assessment post-treatment:  [x]intact []redness- no adverse reaction       []redness  adverse reaction:     37 min Therapeutic Exercise:  [x] See flow sheet: assessed goals for PN; assisted patient with FOTO completion   Rationale: increase ROM, increase strength, improve coordination and improve balance to improve the patients ability to decrease pain with activity and improved functional endurance     0 min Manual Therapy:  NT due to poor tolerance last session   Rationale: decrease pain, increase ROM and increase tissue extensibility to decrease compensatory gait patterns and improve mobility to decrease hip and LS pain     8 min Gait Trainin feet using SPC req SBA and VCs for maintenance of upright posture   Rationale: improve gait quality to improve patient's ability to amb for prolonged distances      X min Patient Education: [x] Review HEP - added ELIZABETH, prone hip extension, and prone heel digs     Other Objective/Functional Measures:     Improvements: sitting tolerance on car ride 2.5 hours, bed mobility   Deficits: housework/cleaning, bending forward, prolonged walking, standing, or sitting   FOTO score: 30/100 (at last assessment, 31/100)   L/S AROM: limited ~50% in all directions   Core strength: 50% bridge   Pain: (A) 7, (B) 6, (W) 10     Patient informed of hold from PT due to pending MD appts; instructed patient to contact office following MD appts    Pain Level (0-10 scale) post treatment: 3    ASSESSMENT/Changes in Function:   See PN/DC planning. Patient will continue to benefit from skilled PT services to modify and progress therapeutic interventions, address functional mobility deficits, address ROM deficits, address strength deficits, analyze and address soft tissue restrictions, analyze and cue movement patterns, analyze and modify body mechanics/ergonomics and assess and modify postural abnormalities to attain remaining goals. [x]  See progress note/recertification (39)  [x]  See Discharge Planning         Progress towards goals / Updated goals: All goals reviewed and progressing appropriately as of 7/3/2017   Short Term Goals: To be accomplished in 1 weeks:  1. Pt will be independent and compliant with HEP.  -Goal progressing - patient reports every other day compliance and elliptical use for 3 min 6/26/17  Long Term Goals: To be accomplished in 8-12 treatments:  1. Patient will increase FOTO score to 51/100 for indications of increased functional mobility. -Goal progressing; min decrease to 30/100  2. Patient will demo increased LS flexion fingertips to mid shin for ease with LE dressing -Goal met; patient demo mid shin AROM (fingertips to patella at IE)  3. Patient will demo 50% bridge for improved core strength and standing tolerance with ADLs. -Goal met; 50% bridge   4.  Patient will amb 500 ft in clinic with [facility] for progression of amb tolerance for community mobility. -Goal progressing; able to amb 200 feet in clinic with [address]  [x]  Upgrade activities as tolerated     [x]  Continue plan of care  [x]  Update interventions per flow sheet       []  Discharge due to:_  [x]  Other: see PN; pt on hold from PT due to MD appts; will hold chart open and write PN as 0-8 treatments    Nicolasa Herrera, ESTHER 7/3/2017
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
## 2017-07-06 ENCOUNTER — OFFICE VISIT (OUTPATIENT)
Dept: ORTHOPEDIC SURGERY | Age: 52
End: 2017-07-06

## 2017-07-06 VITALS
HEIGHT: 65 IN | RESPIRATION RATE: 18 BRPM | WEIGHT: 235 LBS | BODY MASS INDEX: 39.15 KG/M2 | HEART RATE: 82 BPM | SYSTOLIC BLOOD PRESSURE: 139 MMHG | DIASTOLIC BLOOD PRESSURE: 78 MMHG

## 2017-07-06 DIAGNOSIS — M47.816 OSTEOARTHRITIS OF LUMBAR SPINE, UNSPECIFIED SPINAL OSTEOARTHRITIS COMPLICATION STATUS: Primary | Chronic | ICD-10-CM

## 2017-07-06 RX ORDER — GABAPENTIN 300 MG/1
CAPSULE ORAL
Qty: 60 CAP | Refills: 2 | Status: SHIPPED | OUTPATIENT
Start: 2017-07-06

## 2017-07-06 NOTE — PATIENT INSTRUCTIONS
Low Back Arthritis: Exercises  Your Care Instructions  Here are some examples of typical rehabilitation exercises for your condition. Start each exercise slowly. Ease off the exercise if you start to have pain. Your doctor or physical therapist will tell you when you can start these exercises and which ones will work best for you. When you are not being active, find a comfortable position for rest. Some people are comfortable on the floor or a medium-firm bed with a small pillow under their head and another under their knees. Some people prefer to lie on their side with a pillow between their knees. Don't stay in one position for too long. Take short walks (10 to 20 minutes) every 2 to 3 hours. Avoid slopes, hills, and stairs until you feel better. Walk only distances you can manage without pain, especially leg pain. How to do the exercises  Pelvic tilt    1. Lie on your back with your knees bent. 2. \"Brace\" your stomach--tighten your muscles by pulling in and imagining your belly button moving toward your spine. 3. Press your lower back into the floor. You should feel your hips and pelvis rock back. 4. Hold for 6 seconds while breathing smoothly. 5. Relax and allow your pelvis and hips to rock forward. 6. Repeat 8 to 12 times. Back stretches    1. Get down on your hands and knees on the floor. 2. Relax your head and allow it to droop. Round your back up toward the ceiling until you feel a nice stretch in your upper, middle, and lower back. Hold this stretch for as long as it feels comfortable, or about 15 to 30 seconds. 3. Return to the starting position with a flat back while you are on your hands and knees. 4. Let your back sway by pressing your stomach toward the floor. Lift your buttocks toward the ceiling. 5. Hold this position for 15 to 30 seconds. 6. Repeat 2 to 4 times. Follow-up care is a key part of your treatment and safety.  Be sure to make and go to all appointments, and call your doctor if you are having problems. It's also a good idea to know your test results and keep a list of the medicines you take. Where can you learn more? Go to http://cassi-osiris.info/. Enter H168 in the search box to learn more about \"Low Back Arthritis: Exercises. \"  Current as of: March 21, 2017  Content Version: 11.3  © 9021-1558 [employer]. Care instructions adapted under license by [employer] (which disclaims liability or warranty for this information). If you have questions about a medical condition or this instruction, always ask your healthcare professional. [place] [address] any warranty or liability for your use of this information.

## 2017-07-06 NOTE — MR AVS SNAPSHOT
Visit Information Date & Time Provider Department Dept. Phone Encounter #  
 7/6/2017 12:50 PM Joe Melgoza MD [facility] [address] [facility] [phone] [id number] Upcoming Health Maintenance Date Due Hepatitis C Screening 1965 DTaP/Tdap/Td series (1 - Tdap) 4/12/1985 PAP AKA CERVICAL CYTOLOGY 4/12/1985 BREAST CANCER SCRN MAMMOGRAM 4/12/2014 FOBT Q 1 YEAR AGE 50-75 4/12/2014 INFLUENZA AGE 9 TO ADULT 8/1/2017 Allergies as of 7/6/2017  Review Complete On: 7/6/2017 By: Joe Melgoza MD  
 No Known Allergies Current Immunizations  Never Reviewed No immunizations on file. Not reviewed this visit You Were Diagnosed With   
  
 Codes Comments Osteoarthritis of lumbar spine, unspecified spinal osteoarthritis complication status    -  Primary ICD-10-CM: M47.816 ICD-9-CM: 721.3 Vitals BP Pulse Resp Height(growth percentile) Weight(growth percentile) BMI  
 139/78 82 18 5' 5\" (1.651 m) 235 lb (106.6 kg) 39.11 kg/m2 OB Status Smoking Status Hysterectomy Never Smoker BMI and BSA Data Body Mass Index Body Surface Area  
 39.11 kg/m 2 2.21 m 2 Preferred Pharmacy Pharmacy Name Phone [address] [place] [address] [address], [address]. [phone] Your Updated Medication List  
  
   
This list is accurate as of: 7/6/17  1:32 PM.  Always use your most recent med list. amLODIPine 2.5 mg tablet Commonly known as:  [place] Bars Take 2.5 mg by mouth daily. Indications: HYPERTENSION  
  
 azelastine 137 mcg (0.1 %) nasal spray Commonly known as:  ASTELIN  
2 Sprays by Both Nostrils route as needed. beclomethasone 80 mcg/actuation [employer] Commonly known as:  QVAR Take 1 Puff by inhalation two (2) times a day. CALCIUM PO Take 600 mg by mouth daily. CENTRUM PO Take 1 Tab by mouth daily. gummy  
  
 cholecalciferol (VITAMIN D3) 5,000 unit Tab tablet Commonly known as:  VITAMIN D3 Take 5,000 Units by mouth daily. cpap machine kit  
by Does Not Apply route. Start patient on CPAP 6 with ramp and humidification. Medium Quattro or better fitting mask. Supplies 99 month. Please send compliance data [id number]. Diagnosis KONSTANTIN. AHI 11 RDI 11  
  
 gabapentin 300 mg capsule Commonly known as:  NEURONTIN  
TAKE 1 CAP PO QHS X 1 WEEK THEN 1 CAP PO BID  
  
 GLIPIZIDE PO Take 2.5 mg by mouth daily. hydroCHLOROthiazide 25 mg tablet Commonly known as:  HYDRODIURIL Take 25 mg by mouth daily. Indications: HYPERTENSION  
  
 LIPITOR PO Take 40 mg by mouth nightly. losartan 100 mg tablet Commonly known as:  COZAAR Take 100 mg by mouth daily. metFORMIN 500 mg tablet Commonly known as:  GLUCOPHAGE Take 500 mg by mouth daily (with breakfast). montelukast 5 mg chewable tablet Commonly known as:  SINGULAIR Take 1 tablet by mouth nightly. ondansetron 4 mg disintegrating tablet Commonly known as:  ZOFRAN ODT  
4 mg every eight (8) hours as needed. OTHER  
LUMBAR SUPPORT DX: LUMBAR SPONDYLOSIS  
  
 oxyCODONE-acetaminophen 5-325 mg per tablet Commonly known as:  PERCOCET Take 1-2 tablets by mouth every 4-6 hours as needed for pain. PriLOSEC 20 mg capsule Generic drug:  omeprazole Take 20 mg by mouth daily. * albuterol 1.25 mg/3 mL Nebu Commonly known as:  ACCUNEB  
1.25 mg by Nebulization route every four (4) hours as needed. * PROVENTIL PO Take  by mouth as needed. [address] Take  by inhalation daily. SYMBICORT 160-4.5 mcg/actuation HFA inhaler Generic drug:  budesonide-formoterol Take 2 puffs by inhalation two (2) times a day. XOLAIR SC  
by SubCUTAneous route Once every 2 weeks. Indications: ASTHMA PREVENTION  
  
 XOPENEX HFA 45 mcg/actuation inhaler Generic drug:  levalbuterol tartrate Take 2 puffs by inhalation every four (4) hours as needed for Wheezing. * Notice: This list has 2 medication(s) that are the same as other medications prescribed for you. Read the directions carefully, and ask your doctor or other care provider to review them with you. Prescriptions Printed Refills OTHER 0 Sig: LUMBAR SUPPORT 
DX: LUMBAR SPONDYLOSIS Class: Print Prescriptions Sent to Pharmacy Refills  
 gabapentin (NEURONTIN) 300 mg capsule 2 Sig: TAKE 1 CAP PO QHS X 1 WEEK THEN 1 CAP PO BID Class: Normal  
 Pharmacy: [place], [facility] [address].  #: [phone] To-Do List   
 07/07/2017 12:00 PM  
  Appointment with [facility] MRI RM 1 at [address] [facility] ([phone]) GENERAL INSTRUCTIONS  Bring information (ID card) if you have any medically implanted devices. You will be required to lie still while the procedure is being performed. Remove any jewelry (including body piercing, hairpins) prior to MRI. If you have had a creatinine level drawn within the past 30 days, please bring most recent results to your appt. Bring any films, CD's, and reports related to your study with you on the day of your exam.  This only includes studies done outside of [address] [place] [address], \[facility]\"", [place], and [place]. Bring a complete list of all medications you are currently taking to include prescriptions, over-the-counter meds, herbals, vitamins & any dietary supplements. If you were given medications for claustrophobia or anxiety, please arrange to have someone drive you to your appointment. QUESTIONS  Notify the MRI Department if you have any questions concerning your study. [place] - [phone] [place] [id number] [facility] - [phone] Patient Instructions Low Back Arthritis: Exercises Your Care Instructions Here are some examples of typical rehabilitation exercises for your condition. Start each exercise slowly. Ease off the exercise if you start to have pain. Your doctor or physical therapist will tell you when you can start these exercises and which ones will work best for you. When you are not being active, find a comfortable position for rest. Some people are comfortable on the floor or a medium-firm bed with a small pillow under their head and another under their knees. Some people prefer to lie on their side with a pillow between their knees. Don't stay in one position for too long. Take short walks (10 to 20 minutes) every 2 to 3 hours. Avoid slopes, hills, and stairs until you feel better. Walk only distances you can manage without pain, especially leg pain. How to do the exercises Pelvic tilt 1. Lie on your back with your knees bent. 2. \"Brace\" your stomachtighten your muscles by pulling in and imagining your belly button moving toward your spine. 3. Press your lower back into the floor. You should feel your hips and pelvis rock back. 4. Hold for 6 seconds while breathing smoothly. 5. Relax and allow your pelvis and hips to rock forward. 6. Repeat 8 to 12 times. Back stretches 1. Get down on your hands and knees on the floor. 2. Relax your head and allow it to droop. Round your back up toward the ceiling until you feel a nice stretch in your upper, middle, and lower back. Hold this stretch for as long as it feels comfortable, or about 15 to 30 seconds. 3. Return to the starting position with a flat back while you are on your hands and knees. 4. Let your back sway by pressing your stomach toward the floor. Lift your buttocks toward the ceiling. 5. Hold this position for 15 to 30 seconds. 6. Repeat 2 to 4 times. Follow-up care is a key part of your treatment and safety. Be sure to make and go to all appointments, and call your doctor if you are having problems. It's also a good idea to know your test results and keep a list of the medicines you take. Where can you learn more? Go to http://cassi-osiris.info/. Enter T460 in the search box to learn more about \"Low Back Arthritis: Exercises. \" Current as of: March 21, 2017 Content Version: 11.3 © 8435-7288 [employer]. Care instructions adapted under license by [employer] (which disclaims liability or warranty for this information). If you have questions about a medical condition or this instruction, always ask your healthcare professional. [address] any warranty or liability for your use of this information. Introducing [facility]! Alfreda Tan introduces [employer]mu[employer] patient portal. Now you can access parts of your medical record, email your doctor's office, and request medication refills online. 1. In your internet browser, go to [email or web address] Borrowed Dress. [employer] B[employer]ed [employer] 2. Click on the First Time User? Click Here link in the Sign In box. You will see the New Member Sign Up page. 3. Enter your Im[employer] Access Code exactly as it appears below. You will not need to use this code after youve completed the sign-up process. If you do not sign up before the expiration date, you must request a new code. · Immu[employer]rix Access Code: [email or web address] Expires: 8/23/2017 12:37 PM 
 
4. Enter the last four digits of your Social Security Number (xxxx) and Date of Birth (mm/dd/yyyy) as indicated and click Submit. You will be taken to the next sign-up page. 5. Create a ImmuMetrix ID. This will be your ImmuMetrix login ID and cannot be changed, so think of one that is secure and easy to remember. 6. Create a [employer] password. You can change your password at any time. 7. Enter your Password Reset Question and Answer. This can be used at a later time if you forget your password. 8. Enter your e-mail address. You will receive e-mail notification when new information is available in [address] 9. Click Sign Up. You can now view and download portions of your medical record. 10. Click the Download Summary menu link to download a portable copy of your medical information. If you have questions, please visit the Frequently Asked Questions section of the HOSTING website. Remember, HOSTING is NOT to be used for urgent needs. For medical emergencies, dial 911. Now available from your iPhone and Android! Please provide this summary of care documentation to your next provider. Your primary care clinician is listed as Soledad Rao. If you have any questions after today's visit, please call [phone].

## 2017-07-06 NOTE — PROGRESS NOTES
[facility] [address].  [address], [address]  [place], [facility]  Phone: [phone]  Fax: [phone]        Kelsie Judd  : 1965  PCP: Anny Diaz MD    PROGRESS NOTE      ASSESSMENT AND PLAN    Kellie Roque was seen today for back pain. Diagnoses and all orders for this visit:    Osteoarthritis of lumbar spine, w/radiculitis    Other orders  -     gabapentin (NEURONTIN) 300 mg capsule; TAKE 1 CAP PO QHS X 1 WEEK THEN 1 CAP PO BID  -     OTHER; LUMBAR SUPPORT  DX: LUMBAR SPONDYLOSIS    1. Restart Gabapentin. 2. Advised to stay active as tolerated. 3. Will f/u for hip issues. 4. Rx for back brace. Informed pt this should only be worn PRN. 5. Given home exercises. Follow-up Disposition: Not on File      HISTORY OF PRESENT ILLNESS  Real Romero is a 47 y.o. female. Pt was last evaluated in 2014. She has RA, relative cervical stenosis and lumbar spondylosis. At that time she was responding to Gabapentin. In the interim she has had a LT hip replacement. She states that she has to go back to her surgeon as she is having issues with her hip replacement. Pt presents to the office today with recurring low back pain. Her pain pain radiates into her LT hip and into her BLE. She states that in  she slipped and fell in the tub. She states that her pain was unbearable before her fall. She notes that she has started to have LUE paresthesia. She has always had some paresthesia in her RUE. She notes that her neck pain has gotten better since her fall but continues to have pain. Her pain radiates down her back and into her low back pain. She states that her pain has been unbearable lately. Pt notes that she has a short standing and walking tolerance. She notes that she will be having an arthrogram on her hip to evaluate her LT hip pain. Pt states that her pain will increase with hip ROM. She also has LT groin pain.  She denies any weakness in her BLE. No saddle paresthesia. She is no longer on Gabapentin. Pt was given Percocet from the ED. She was in physical therapy but has completed. Denies persistent fevers, chills, weight changes, neurogenic bowel or bladder symptoms. Hx of PUD. She states that her back brace is now worn out, she was using this often. Pain Assessment  7/6/2017   Location of Pain Back   Severity of Pain 8   Quality of Pain Aching   Frequency of Pain Constant   Aggravating Factors Standing;Walking;Bending   Relieving Factors Heat   Relieving Factors Comment laying           Lumbar spine xray films from 5/24/17 reviewed;  IMPRESSION:  1. No acute pathology in the lumbar spine. 2. No dynamic instability appreciated. 2. Unchanged moderate multilevel degenerative disc disease. Thoracic spine xray films from 5/24/17 reviewed;    IMPRESSION:  1. No acute pathology in the thoracic spine. 2. Moderate multilevel degenerative disc disease. 2. No evidence of dynamic instability. 4. Scoliosis of the thoracic spine. Bending films:  IMPRESSION:  1. No acute pathology in the thoracic spine. 2. Moderate multilevel degenerative disc disease. 2. No evidence of dynamic instability. 4. Scoliosis of the thoracic spine. Cervical spine xray films from 6/19/17 reviewed:  Conclusion:  No acute abnormality. Multiple Degenerative disc disease. Thoracic spine xray films from 6/19/17 reviewed:  Thoracolumbar curvature and Degenerative disc disease. No acute abnormality. Lumbar spine xray films from 6/19/17 reviewed:  No acute abnormality. Lumbar curvature and Degenerative changes.                PAST MEDICAL HISTORY   Past Medical History:   Diagnosis Date    Arthritis     bilaterial knees    Asthma     fair control, last attack October 2015    Balance problems     Bursitis of hip     Chondromalacia of patella, right     chronic    Chronic lung disease     Chronic pain     left hip knees    CTS (carpal tunnel syndrome)     right wrist    Degenerative arthritis of right knee     mild    Depression     Diabetes ([address] 4-2014    Epicondylitis     right elbow    Genu valgum     right knee    GERD (gastroesophageal reflux disease)     fair control, food specific. no current symptoms    Hyperlipemia     Hypertension 2006    Impingement syndrome of shoulder     right    Necrosis (HCC)     Osteoarthritis of left hip 5/3/2016    PUD (peptic ulcer disease)     Shortness of breath     Sleep apnea 3/19/2015    Mild AHI 11 But severe sleepiness ESS 17    Tendonitis of shoulder, right     Unspecified sleep apnea      uses CPAP       Past Surgical History:   Procedure Laterality Date    COLONOSCOPY N/A 6/28/2017    COLONOS[facility] ENDOSCOPY    HX HIP REPLACEMENT Left 05/2016    HX HYSTERECTOMY      HX ORTHOPAEDIC      L hip replacement    HX TUBAL LIGATION     . MEDICATIONS    Current Outpatient Prescriptions   Medication Sig Dispense Refill    omeprazole (PRILOSEC) 20 mg capsule Take 20 mg by mouth daily.  oxyCODONE-acetaminophen (PERCOCET) 5-325 mg per tablet Take 1-2 tablets by mouth every 4-6 hours as needed for pain. 60 Tab 0    amLODIPine (NORVASC) 2.5 mg tablet Take 2.5 mg by mouth daily. Indications: HYPERTENSION      hydrochlorothiazide (HYDRODIURIL) 25 mg tablet Take 25 mg by mouth daily. Indications: HYPERTENSION      FOLIC ACID/MV,FE,MIN (CENTRUM PO) Take 1 Tab by mouth daily. gummy      cholecalciferol, VITAMIN D3, (VITAMIN D3) 5,000 unit tab tablet Take 5,000 Units by mouth daily.  beclomethasone (QVAR) 80 mcg/actuation inhaler Take 1 Puff by inhalation two (2) times a day.  albuterol (ACCUNEB) 1.25 mg/3 mL nebulizer solution 1.25 mg by Nebulization route every four (4) hours as needed.  losartan (COZAAR) 100 mg tablet Take 100 mg by mouth daily.  cpap machine kit by Does Not Apply route.  Start patient on CPAP 6 with ramp and humidification. Medium Quattro or better fitting mask. Supplies 99 month. Please send compliance data [id number]. Diagnosis KONSTANTIN. AHI 11 RDI 11 1 Kit 0    azelastine (ASTELIN) 137 mcg nasal spray 2 Sprays by Both Nostrils route as needed.  ondansetron (ZOFRAN ODT) 4 mg disintegrating tablet 4 mg every eight (8) hours as needed.  metformin (GLUCOPHAGE) 500 mg tablet Take 500 mg by mouth daily (with breakfast).  levalbuterol tartrate (XOPENEX HFA) 45 mcg/actuation inhaler Take 2 puffs by inhalation every four (4) hours as needed for Wheezing.  budesonide-formoterol (SYMBICORT) 160-4.5 mcg/actuation HFA inhaler Take 2 puffs by inhalation two (2) times a day.  montelukast (SINGULAIR) 5 mg chewable tablet Take 1 tablet by mouth nightly. 30 tablet 2    CALCIUM PO Take 600 mg by mouth daily.  GLIPIZIDE PO Take 2.5 mg by mouth daily.  ATORVASTATIN CALCIUM (LIPITOR PO) Take 40 mg by mouth nightly.  ALBUTEROL SULFATE (PROVENTIL PO) Take  by mouth as needed.  TIOTROPIUM BROMIDE (SPIRIVA RESPIMAT IN) Take  by inhalation daily.  OMALIZUMAB (XOLAIR SC) by SubCUTAneous route Once every 2 weeks. Indications: ASTHMA PREVENTION          ALLERGIES  No Known Allergies       SOCIAL HISTORY    Social History     Social History    Marital status:      Spouse name: N/A    Number of children: N/A    Years of education: N/A     Occupational History    Not on file.      Social History Main Topics    Smoking status: Never Smoker    Smokeless tobacco: Never Used    Alcohol use No    Drug use: No    Sexual activity: No     Other Topics Concern    Not on file     Social History Narrative       FAMILY HISTORY  Family History   Problem Relation Age of Onset    Diabetes Mother     Cancer Mother     Diabetes Father     Cancer Father     Diabetes Other      Grandfather    Hypertension Other     Arthritis-osteo Other        REVIEW OF SYSTEMS  Review of Systems Constitutional: Negative for chills, fever and weight loss. Respiratory: Negative for shortness of breath. Cardiovascular: Negative for chest pain. Gastrointestinal: Negative for constipation. Negative for fecal incontinence   Genitourinary: Negative for dysuria. Negative for urinary incontinence   Musculoskeletal:        Per HPI   Skin: Negative for rash. Neurological: Positive for tingling and focal weakness. Negative for dizziness, tremors and headaches. Endo/Heme/Allergies: Does not bruise/bleed easily. Psychiatric/Behavioral: The patient does not have insomnia. PHYSICAL EXAMINATION  Visit Vitals    /78    Pulse 82    Resp 18    Ht 5' 5\" (1.651 m)    Wt 235 lb (106.6 kg)    BMI 39.11 kg/m2         Accompanied by family member. Constitutional:  Well developed, well nourished, in no acute distress. Psychiatric: Affect and mood are appropriate. Integumentary: No rashes or abrasions noted on exposed areas. Cardiovascular/Peripheral Vascular: Intact l pulses. No peripheral edema is noted. Lymphatic:  No evidence of lymphedema. No cervical lymphadenopathy. SPINE/MUSCULOSKELETAL EXAM    Cervical spine:  Neck is midline. Normal muscle tone. No focal atrophy is noted. Shoulder ROM intact. Tenderness to palpation C7. Negative Spurling's sign. Negative Tinel's sign. Negative Miller's sign. Sensation grossly intact to light touch. Lumbar spine:  No rash, ecchymosis, or gross obliquity. No fasciculations. No focal atrophy is noted. Pain with LT hip ROM. Tenderness to palpation TL jx to the LS jx, most pronounced at  L3-4. No tenderness to palpation at the sciatic notch. SI joints non-tender. Sensation grossly intact to light touch.       MOTOR:      Biceps  Triceps Deltoids Wrist Ext Wrist Flex Hand Intrin   Right +4/5 +4/5 +4/5 +4/5 +4/5 4/5   Left +4/5 +4/5 +4/5 +4/5 +4/5 4/5        Hip Flex  Quads Hamstrings Ankle DF EHL Ankle PF   Right +4/5 +4/5 +4/5 +4/5 +4/5 +4/5   Left +4/5 +4/5 +4/5 +4/5 +4/5 +4/5       DTRs are 2+ biceps, triceps, brachioradialis,    Straight Leg raise negative. Ambulation with single point cane. FWB. Written by Virgie De Paz, as dictated by Scarlet Finney MD.    I, Dr. Scarlet Finney MD, confirm that all documentation is accurate. Ms. Suresh Flanagan may have a reminder for a \"due or due soon\" health maintenance. I have asked that she contact her primary care provider for follow-up on this health maintenance.

## 2017-07-06 NOTE — PROGRESS NOTES
VORB ENTERED PER DR. Itzel Mahmood AS DOCUMENTED ON BLUE SHEET: GABAPENTIN 300 MG 1 CAP PO QHS X 1 WEEK THEN 1 CAP PO BID #60 RF 2; LUMBAR SUPPORT RX WITH DX LUMBAR SPONDYLOSIS.

## 2017-07-07 ENCOUNTER — HOSPITAL ENCOUNTER (OUTPATIENT)
Dept: MRI IMAGING | Age: 52
Discharge: HOME | End: 2017-07-07
Attending: ORTHOPAEDIC SURGERY
Payer: MEDICAID

## 2017-07-07 DIAGNOSIS — M25.552 HIP PAIN, LEFT: ICD-10-CM

## 2017-07-07 PROCEDURE — 73721 MRI JNT OF LWR EXTRE W/O DYE: CPT

## 2018-03-23 ENCOUNTER — HOSPITAL ENCOUNTER (OUTPATIENT)
Dept: CT IMAGING | Age: 53
Discharge: HOME | End: 2018-03-23
Attending: NURSE PRACTITIONER
Payer: MEDICAID

## 2018-03-23 ENCOUNTER — HOSPITAL ENCOUNTER (OUTPATIENT)
Dept: NON INVASIVE DIAGNOSTICS | Age: 53
Discharge: HOME | End: 2018-03-23
Attending: NURSE PRACTITIONER
Payer: MEDICAID

## 2018-03-23 DIAGNOSIS — J98.2 INTERSTITIAL EMPHYSEMA (HCC): ICD-10-CM

## 2018-03-23 DIAGNOSIS — R06.02 SHORTNESS OF BREATH: ICD-10-CM

## 2018-03-23 PROCEDURE — 93306 TTE W/DOPPLER COMPLETE: CPT

## 2018-03-23 PROCEDURE — 71250 CT THORAX DX C-: CPT

## 2019-06-25 ENCOUNTER — HOSPITAL ENCOUNTER (OUTPATIENT)
Dept: GENERAL RADIOLOGY | Age: 54
Discharge: HOME | End: 2019-06-25
Payer: MEDICAID

## 2019-06-25 DIAGNOSIS — R52 PAIN: ICD-10-CM

## 2019-06-25 PROCEDURE — 73502 X-RAY EXAM HIP UNI 2-3 VIEWS: CPT

## 2019-09-04 ENCOUNTER — HOSPITAL ENCOUNTER (OUTPATIENT)
Dept: LAB | Age: 54
Discharge: HOME | End: 2019-09-04

## 2019-09-04 LAB — SENTARA SPECIMEN COL,SENBCF: NORMAL

## 2019-09-04 PROCEDURE — 99001 SPECIMEN HANDLING PT-LAB: CPT

## 2019-10-21 ENCOUNTER — HOSPITAL ENCOUNTER (OUTPATIENT)
Dept: CT IMAGING | Age: 54
Discharge: HOME | End: 2019-10-21
Attending: NURSE PRACTITIONER
Payer: MEDICAID

## 2019-10-21 DIAGNOSIS — R91.1 SOLITARY PULMONARY NODULE: ICD-10-CM

## 2019-10-21 PROCEDURE — 71250 CT THORAX DX C-: CPT

## 2020-02-05 ENCOUNTER — HOSPITAL ENCOUNTER (OUTPATIENT)
Dept: GENERAL RADIOLOGY | Age: 55
Discharge: HOME | End: 2020-02-05
Attending: INTERNAL MEDICINE
Payer: MEDICAID

## 2020-02-05 DIAGNOSIS — R10.9 STOMACH ACHE: ICD-10-CM

## 2020-02-05 PROCEDURE — 74019 RADEX ABDOMEN 2 VIEWS: CPT

## 2020-02-18 ENCOUNTER — HOSPITAL ENCOUNTER (OUTPATIENT)
Dept: CT IMAGING | Age: 55
Discharge: HOME | End: 2020-02-18
Attending: INTERNAL MEDICINE
Payer: MEDICAID

## 2020-02-18 ENCOUNTER — HOSPITAL ENCOUNTER (OUTPATIENT)
Dept: NON INVASIVE DIAGNOSTICS | Age: 55
Discharge: HOME | End: 2020-02-18
Attending: INTERNAL MEDICINE
Payer: MEDICAID

## 2020-02-18 VITALS
DIASTOLIC BLOOD PRESSURE: 68 MMHG | SYSTOLIC BLOOD PRESSURE: 130 MMHG | BODY MASS INDEX: 39.15 KG/M2 | HEIGHT: 65 IN | WEIGHT: 235 LBS

## 2020-02-18 DIAGNOSIS — J84.10 PULMONARY FIBROSIS (HCC): ICD-10-CM

## 2020-02-18 DIAGNOSIS — I27.21 PULMONARY ARTERY HYPERTENSION (HCC): ICD-10-CM

## 2020-02-18 LAB
ECHO AO ASC DIAM: 3.38 CM
ECHO AO ROOT DIAM: 2.89 CM
ECHO IVC SNIFF: 1.72 CM
ECHO LA MAJOR AXIS: 3.23 CM
ECHO LA TO AORTIC ROOT RATIO: 1.12
ECHO LV EDV A2C: 84.7 ML
ECHO LV EDV A4C: 105.9 ML
ECHO LV EDV BP: 95 ML (ref 56–104)
ECHO LV EDV INDEX A4C: 50 ML/M2
ECHO LV EDV INDEX BP: 44.8 ML/M2
ECHO LV EDV NDEX A2C: 40 ML/M2
ECHO LV EDV TEICHHOLZ: 0.59 ML
ECHO LV EJECTION FRACTION A2C: 49 %
ECHO LV EJECTION FRACTION A4C: 60 %
ECHO LV EJECTION FRACTION BIPLANE: 53.1 % (ref 55–100)
ECHO LV ESV A2C: 43.6 ML
ECHO LV ESV A4C: 42.3 ML
ECHO LV ESV BP: 44.6 ML (ref 19–49)
ECHO LV ESV INDEX A2C: 20.6 ML/M2
ECHO LV ESV INDEX A4C: 20 ML/M2
ECHO LV ESV INDEX BP: 21.1 ML/M2
ECHO LV ESV TEICHHOLZ: 0.34 ML
ECHO LV INTERNAL DIMENSION DIASTOLIC: 4.59 CM (ref 3.9–5.3)
ECHO LV INTERNAL DIMENSION SYSTOLIC: 3.65 CM
ECHO LV IVSD: 0.83 CM (ref 0.6–0.9)
ECHO LV MASS 2D: 110.4 G (ref 67–162)
ECHO LV MASS INDEX 2D: 52.1 G/M2 (ref 43–95)
ECHO LV POSTERIOR WALL DIASTOLIC: 0.58 CM (ref 0.6–0.9)
ECHO LVOT DIAM: 2.08 CM
ECHO LVOT PEAK GRADIENT: 3.5 MMHG
ECHO LVOT PEAK VELOCITY: 93.57 CM/S
ECHO LVOT SV: 73.4 ML
ECHO LVOT VTI: 21.59 CM
ECHO MV A VELOCITY: 90.64 CM/S
ECHO MV E DECELERATION TIME (DT): 256.5 MS
ECHO MV E VELOCITY: 68.08 CM/S
ECHO MV E/A RATIO: 0.75
ECHO PULMONARY ARTERY SYSTOLIC PRESSURE (PASP): 31.2 MMHG
ECHO RA MINOR AXIS: 3.78 CM
ECHO TV REGURGITANT MAX VELOCITY: 265.48 CM/S
ECHO TV REGURGITANT PEAK GRADIENT: 28.2 MMHG
LVFS 2D: 20.57 %
LVOT MG: 2.08 MMHG
LVOT MV: 0.68 CM/S
LVSV (MOD BI): 22.83 ML
LVSV (MOD SINGLE 4C): 28.74 ML
LVSV (MOD SINGLE): 18.57 ML
LVSV (TEICH): 18.42 ML
MV DEC SLOPE: 2.65

## 2020-02-18 PROCEDURE — 93306 TTE W/DOPPLER COMPLETE: CPT

## 2020-02-18 PROCEDURE — 71250 CT THORAX DX C-: CPT

## 2020-02-25 ENCOUNTER — HOSPITAL ENCOUNTER (OUTPATIENT)
Dept: LAB | Age: 55
Discharge: HOME | End: 2020-02-25

## 2020-02-25 LAB — SENTARA SPECIMEN COL,SENBCF: NORMAL

## 2020-02-25 PROCEDURE — 99001 SPECIMEN HANDLING PT-LAB: CPT

## 2020-07-28 ENCOUNTER — HOSPITAL ENCOUNTER (OUTPATIENT)
Dept: GENERAL RADIOLOGY | Age: 55
Discharge: HOME | End: 2020-07-28
Payer: MEDICAID

## 2020-07-28 DIAGNOSIS — J45.50 SEVERE PERSISTENT ASTHMA: ICD-10-CM

## 2020-07-28 PROCEDURE — 71046 X-RAY EXAM CHEST 2 VIEWS: CPT

## 2020-12-03 ENCOUNTER — HOSPITAL ENCOUNTER (OUTPATIENT)
Dept: GENERAL RADIOLOGY | Age: 55
Discharge: HOME | End: 2020-12-03
Payer: MEDICAID

## 2020-12-03 ENCOUNTER — TRANSCRIBE ORDER (OUTPATIENT)
Dept: REGISTRATION | Age: 55
End: 2020-12-03

## 2020-12-03 DIAGNOSIS — J06.9 ACUTE UPPER RESPIRATORY INFECTION: ICD-10-CM

## 2020-12-03 DIAGNOSIS — J06.9 ACUTE UPPER RESPIRATORY INFECTION: Primary | ICD-10-CM

## 2020-12-03 PROCEDURE — 71046 X-RAY EXAM CHEST 2 VIEWS: CPT

## (undated) DEVICE — MEDI-VAC NON-CONDUCTIVE SUCTION TUBING: Brand: CARDINAL HEALTH

## (undated) DEVICE — FORCEPS BX L240CM JAW DIA2.8MM L CAP W/ NDL MIC MESH TOOTH

## (undated) DEVICE — DEVICE INFL 60ML 12ATM CONVENIENT LOK REL HNDL HI PRSS FLX

## (undated) DEVICE — KIT COLON W/ 1.1OZ LUB AND 2 END

## (undated) DEVICE — KENDALL 500 SERIES DIAPHORETIC FOAM MONITORING ELECTRODE - TEAR DROP SHAPE ( 30/PK): Brand: KENDALL

## (undated) DEVICE — BASIN EMESIS 500CC ROSE 250/CS 60/PLT: Brand: MEDEGEN MEDICAL PRODUCTS, LLC

## (undated) DEVICE — SYR 50ML SLIP TIP NSAF LF STRL --

## (undated) DEVICE — CONTAINER PREFIL FRMLN 15ML --

## (undated) DEVICE — FLEX ADVANTAGE 1500CC: Brand: FLEX ADVANTAGE